# Patient Record
Sex: FEMALE | Race: WHITE | NOT HISPANIC OR LATINO | Employment: OTHER | URBAN - METROPOLITAN AREA
[De-identification: names, ages, dates, MRNs, and addresses within clinical notes are randomized per-mention and may not be internally consistent; named-entity substitution may affect disease eponyms.]

---

## 2017-07-14 ENCOUNTER — HOSPITAL ENCOUNTER (EMERGENCY)
Facility: CLINIC | Age: 62
Discharge: HOME | End: 2017-07-14
Attending: EMERGENCY MEDICINE | Admitting: EMERGENCY MEDICINE
Payer: COMMERCIAL

## 2017-07-14 ENCOUNTER — APPOINTMENT (OUTPATIENT)
Dept: CT IMAGING | Facility: CLINIC | Age: 62
End: 2017-07-14
Attending: EMERGENCY MEDICINE
Payer: COMMERCIAL

## 2017-07-14 VITALS — OXYGEN SATURATION: 92 % | TEMPERATURE: 97.9 F | DIASTOLIC BLOOD PRESSURE: 75 MMHG | SYSTOLIC BLOOD PRESSURE: 121 MMHG

## 2017-07-14 DIAGNOSIS — R10.32 ABDOMINAL PAIN, LEFT LOWER QUADRANT: ICD-10-CM

## 2017-07-14 DIAGNOSIS — R11.0 NAUSEA: ICD-10-CM

## 2017-07-14 DIAGNOSIS — R10.9 LEFT FLANK PAIN: ICD-10-CM

## 2017-07-14 LAB
ALBUMIN UR-MCNC: NEGATIVE MG/DL
ANION GAP SERPL CALCULATED.3IONS-SCNC: 8 MMOL/L (ref 3–14)
APPEARANCE UR: ABNORMAL
BASOPHILS # BLD AUTO: 0 10E9/L (ref 0–0.2)
BASOPHILS NFR BLD AUTO: 0.2 %
BILIRUB UR QL STRIP: NEGATIVE
BUN SERPL-MCNC: 19 MG/DL (ref 7–30)
CALCIUM SERPL-MCNC: 8.8 MG/DL (ref 8.5–10.1)
CHLORIDE SERPL-SCNC: 106 MMOL/L (ref 94–109)
CO2 SERPL-SCNC: 27 MMOL/L (ref 20–32)
COLOR UR AUTO: YELLOW
CREAT SERPL-MCNC: 1.12 MG/DL (ref 0.52–1.04)
DIFFERENTIAL METHOD BLD: ABNORMAL
EOSINOPHIL # BLD AUTO: 0 10E9/L (ref 0–0.7)
EOSINOPHIL NFR BLD AUTO: 0.3 %
ERYTHROCYTE [DISTWIDTH] IN BLOOD BY AUTOMATED COUNT: 12.7 % (ref 10–15)
GFR SERPL CREATININE-BSD FRML MDRD: 50 ML/MIN/1.7M2
GLUCOSE SERPL-MCNC: 141 MG/DL (ref 70–99)
GLUCOSE UR STRIP-MCNC: NEGATIVE MG/DL
HCT VFR BLD AUTO: 41.6 % (ref 35–47)
HGB BLD-MCNC: 13.5 G/DL (ref 11.7–15.7)
HGB UR QL STRIP: NEGATIVE
IMM GRANULOCYTES # BLD: 0 10E9/L (ref 0–0.4)
IMM GRANULOCYTES NFR BLD: 0.3 %
KETONES UR STRIP-MCNC: NEGATIVE MG/DL
LACTATE BLD-SCNC: 2.6 MMOL/L (ref 0.7–2.1)
LACTATE BLD-SCNC: 2.9 MMOL/L (ref 0.7–2.1)
LEUKOCYTE ESTERASE UR QL STRIP: ABNORMAL
LYMPHOCYTES # BLD AUTO: 1 10E9/L (ref 0.8–5.3)
LYMPHOCYTES NFR BLD AUTO: 8.7 %
MCH RBC QN AUTO: 29.6 PG (ref 26.5–33)
MCHC RBC AUTO-ENTMCNC: 32.5 G/DL (ref 31.5–36.5)
MCV RBC AUTO: 91 FL (ref 78–100)
MONOCYTES # BLD AUTO: 0.4 10E9/L (ref 0–1.3)
MONOCYTES NFR BLD AUTO: 3.6 %
MUCOUS THREADS #/AREA URNS LPF: PRESENT /LPF
NEUTROPHILS # BLD AUTO: 10.4 10E9/L (ref 1.6–8.3)
NEUTROPHILS NFR BLD AUTO: 86.9 %
NITRATE UR QL: NEGATIVE
PH UR STRIP: 7.5 PH (ref 5–7)
PLATELET # BLD AUTO: 200 10E9/L (ref 150–450)
POTASSIUM SERPL-SCNC: 3.8 MMOL/L (ref 3.4–5.3)
RBC # BLD AUTO: 4.56 10E12/L (ref 3.8–5.2)
RBC #/AREA URNS AUTO: 4 /HPF (ref 0–2)
SODIUM SERPL-SCNC: 141 MMOL/L (ref 133–144)
SP GR UR STRIP: 1.02 (ref 1–1.03)
SQUAMOUS #/AREA URNS AUTO: 1 /HPF (ref 0–1)
URN SPEC COLLECT METH UR: ABNORMAL
UROBILINOGEN UR STRIP-MCNC: NORMAL MG/DL (ref 0–2)
WBC # BLD AUTO: 12 10E9/L (ref 4–11)
WBC #/AREA URNS AUTO: 4 /HPF (ref 0–2)

## 2017-07-14 PROCEDURE — 85025 COMPLETE CBC W/AUTO DIFF WBC: CPT | Performed by: EMERGENCY MEDICINE

## 2017-07-14 PROCEDURE — 74176 CT ABD & PELVIS W/O CONTRAST: CPT

## 2017-07-14 PROCEDURE — 96374 THER/PROPH/DIAG INJ IV PUSH: CPT

## 2017-07-14 PROCEDURE — 25000128 H RX IP 250 OP 636: Performed by: EMERGENCY MEDICINE

## 2017-07-14 PROCEDURE — 83605 ASSAY OF LACTIC ACID: CPT | Performed by: EMERGENCY MEDICINE

## 2017-07-14 PROCEDURE — 25000132 ZZH RX MED GY IP 250 OP 250 PS 637: Performed by: EMERGENCY MEDICINE

## 2017-07-14 PROCEDURE — 99285 EMERGENCY DEPT VISIT HI MDM: CPT | Performed by: EMERGENCY MEDICINE

## 2017-07-14 PROCEDURE — 80048 BASIC METABOLIC PNL TOTAL CA: CPT | Performed by: EMERGENCY MEDICINE

## 2017-07-14 PROCEDURE — 99285 EMERGENCY DEPT VISIT HI MDM: CPT | Mod: 25

## 2017-07-14 PROCEDURE — 96361 HYDRATE IV INFUSION ADD-ON: CPT

## 2017-07-14 PROCEDURE — 96375 TX/PRO/DX INJ NEW DRUG ADDON: CPT

## 2017-07-14 PROCEDURE — 81001 URINALYSIS AUTO W/SCOPE: CPT | Performed by: EMERGENCY MEDICINE

## 2017-07-14 RX ORDER — HYDROCODONE BITARTRATE AND ACETAMINOPHEN 5; 325 MG/1; MG/1
1 TABLET ORAL EVERY 4 HOURS PRN
Status: DISCONTINUED | OUTPATIENT
Start: 2017-07-14 | End: 2017-07-14 | Stop reason: HOSPADM

## 2017-07-14 RX ORDER — KETOROLAC TROMETHAMINE 30 MG/ML
30 INJECTION, SOLUTION INTRAMUSCULAR; INTRAVENOUS ONCE
Status: COMPLETED | OUTPATIENT
Start: 2017-07-14 | End: 2017-07-14

## 2017-07-14 RX ORDER — ONDANSETRON 2 MG/ML
4 INJECTION INTRAMUSCULAR; INTRAVENOUS EVERY 30 MIN PRN
Status: DISCONTINUED | OUTPATIENT
Start: 2017-07-14 | End: 2017-07-14 | Stop reason: HOSPADM

## 2017-07-14 RX ORDER — LIDOCAINE 40 MG/G
CREAM TOPICAL
Status: DISCONTINUED | OUTPATIENT
Start: 2017-07-14 | End: 2017-07-14 | Stop reason: HOSPADM

## 2017-07-14 RX ORDER — HYDROCODONE BITARTRATE AND ACETAMINOPHEN 5; 325 MG/1; MG/1
2 TABLET ORAL ONCE
Status: COMPLETED | OUTPATIENT
Start: 2017-07-14 | End: 2017-07-14

## 2017-07-14 RX ORDER — HYDROCODONE BITARTRATE AND ACETAMINOPHEN 5; 325 MG/1; MG/1
1-2 TABLET ORAL EVERY 4 HOURS PRN
Qty: 5 TABLET | Refills: 0 | Status: SHIPPED | OUTPATIENT
Start: 2017-07-14 | End: 2022-11-02

## 2017-07-14 RX ORDER — HYDROMORPHONE HYDROCHLORIDE 1 MG/ML
0.5 INJECTION, SOLUTION INTRAMUSCULAR; INTRAVENOUS; SUBCUTANEOUS
Status: DISCONTINUED | OUTPATIENT
Start: 2017-07-14 | End: 2017-07-14 | Stop reason: HOSPADM

## 2017-07-14 RX ORDER — SODIUM CHLORIDE 9 MG/ML
1000 INJECTION, SOLUTION INTRAVENOUS CONTINUOUS
Status: DISCONTINUED | OUTPATIENT
Start: 2017-07-14 | End: 2017-07-14 | Stop reason: HOSPADM

## 2017-07-14 RX ORDER — HYDROCODONE BITARTRATE AND ACETAMINOPHEN 5; 325 MG/1; MG/1
1-2 TABLET ORAL EVERY 4 HOURS PRN
Qty: 10 TABLET | Refills: 0 | Status: SHIPPED | OUTPATIENT
Start: 2017-07-14 | End: 2022-11-02

## 2017-07-14 RX ADMIN — HYDROCODONE BITARTRATE AND ACETAMINOPHEN 2 TABLET: 5; 325 TABLET ORAL at 02:46

## 2017-07-14 RX ADMIN — SODIUM CHLORIDE 1000 ML: 9 INJECTION, SOLUTION INTRAVENOUS at 01:52

## 2017-07-14 RX ADMIN — KETOROLAC TROMETHAMINE 30 MG: 30 INJECTION, SOLUTION INTRAMUSCULAR at 00:35

## 2017-07-14 RX ADMIN — ONDANSETRON 4 MG: 2 INJECTION INTRAMUSCULAR; INTRAVENOUS at 00:35

## 2017-07-14 RX ADMIN — HYDROMORPHONE HYDROCHLORIDE 0.5 MG: 1 INJECTION, SOLUTION INTRAMUSCULAR; INTRAVENOUS; SUBCUTANEOUS at 00:36

## 2017-07-14 RX ADMIN — SODIUM CHLORIDE 1000 ML: 9 INJECTION, SOLUTION INTRAVENOUS at 00:35

## 2017-07-14 ASSESSMENT — ENCOUNTER SYMPTOMS
ABDOMINAL PAIN: 1
VOMITING: 1
FEVER: 0
DYSURIA: 0
FLANK PAIN: 1
NAUSEA: 1
DIARRHEA: 0
SHORTNESS OF BREATH: 0

## 2017-07-14 NOTE — ED PROVIDER NOTES
History     Chief Complaint   Patient presents with     Flank Pain     Pt here with left sided flank pain and LLQ abdominal pain .     JANE Lopez is a 61 year old female who has past medical history significant for depression, anxiety, migraine headaches, presenting to the emergency department with complaints of acute onset of left flank, left back, and left lower quadrant abdominal pain.  Pain began approximately 2 hours prior to arrival.  There was associated nausea, without vomiting.  Patient denies any urinary symptoms.  She did go to the bathroom once prior to arrival.  She had normal bowel movement earlier in the day.  She has history of prior appendectomy, no other surgical procedures.  She did have bowel perforation in May of last year, which was contained, and no surgery was required.  Denies fever, or chills.  Does have history of kidney stones, which feels somewhat similar.  Patient is never smoker, and no significant alcohol use.    I have reviewed the Medications, Allergies, Past Medical and Surgical History, and Social History in the Epic system.         Review of Systems   Constitutional: Negative for fever.   Respiratory: Negative for shortness of breath.    Cardiovascular: Negative for chest pain.   Gastrointestinal: Positive for abdominal pain, nausea and vomiting. Negative for diarrhea.   Genitourinary: Positive for flank pain. Negative for dysuria.   All other systems reviewed and are negative.      Physical Exam   BP: (!) 143/95  Heart Rate: 85  Temp: 97.9  F (36.6  C)  SpO2: 98 %  Physical Exam  BP (!) 143/95  Temp 97.9  F (36.6  C) (Oral)  SpO2 98%  General: alert, interactive, in no apparent distress  Head: atraumatic  Nose: no rhinorrhea or epistaxis  Ears: no external auditory canal discharge or bleeding.    Eyes: Sclera nonicteric. Conjunctiva noninjected. PERRL, EOMI  Mouth: no tonsillar erythema, edema, or exudate  Neck: supple, no palp LAD  Lungs: CTAB  CV: RRR, S1/S2;  peripheral pulses palpable and symmetric  Abdomen: soft, tender in LLQ/left flank, nd, no guarding or rebound. Positive bowel sounds  Extremities: no cyanosis or edema  Skin: no rash or diaphoresis  Neuro: CN II-XII grossly intact, strength 5/5 in UE and LEs bilaterally, sensation intact to light touch in UE and LEs bilaterally;       ED Course     ED Course     Procedures             Critical Care time:  none     Lactate is greater than 2 due to vomiting and gastroenteritis, at this time there is no sign of sepsis.         Labs Ordered and Resulted from Time of ED Arrival Up to the Time of Departure from the ED   URINE MACROSCOPIC WITH REFLEX TO MICRO - Abnormal; Notable for the following:        Result Value    pH Urine 7.5 (*)     Leukocyte Esterase Urine Small (*)     RBC Urine 4 (*)     WBC Urine 4 (*)     Mucous Urine Present (*)     All other components within normal limits   CBC WITH PLATELETS DIFFERENTIAL - Abnormal; Notable for the following:     WBC 12.0 (*)     All other components within normal limits   BASIC METABOLIC PANEL - Abnormal; Notable for the following:     Glucose 141 (*)     Creatinine 1.12 (*)     GFR Estimate 50 (*)     GFR Estimate If Black 60 (*)     All other components within normal limits   LACTIC ACID WHOLE BLOOD - Abnormal; Notable for the following:     Lactic Acid 2.9 (*)     All other components within normal limits   LACTIC ACID WHOLE BLOOD - Abnormal; Notable for the following:     Lactic Acid 2.6 (*)     All other components within normal limits   PERIPHERAL IV CATHETER     Results for orders placed or performed during the hospital encounter of 07/14/17 (from the past 24 hour(s))   UA reflex to Microscopic   Result Value Ref Range    Color Urine Yellow     Appearance Urine Slightly Cloudy     Glucose Urine Negative NEG mg/dL    Bilirubin Urine Negative NEG    Ketones Urine Negative NEG mg/dL    Specific Gravity Urine 1.019 1.003 - 1.035    Blood Urine Negative NEG    pH Urine  7.5 (H) 5.0 - 7.0 pH    Protein Albumin Urine Negative NEG mg/dL    Urobilinogen mg/dL Normal 0.0 - 2.0 mg/dL    Nitrite Urine Negative NEG    Leukocyte Esterase Urine Small (A) NEG    Source Unspecified Urine     RBC Urine 4 (H) 0 - 2 /HPF    WBC Urine 4 (H) 0 - 2 /HPF    Squamous Epithelial /HPF Urine 1 0 - 1 /HPF    Mucous Urine Present (A) NEG /LPF   CBC with platelets differential   Result Value Ref Range    WBC 12.0 (H) 4.0 - 11.0 10e9/L    RBC Count 4.56 3.8 - 5.2 10e12/L    Hemoglobin 13.5 11.7 - 15.7 g/dL    Hematocrit 41.6 35.0 - 47.0 %    MCV 91 78 - 100 fl    MCH 29.6 26.5 - 33.0 pg    MCHC 32.5 31.5 - 36.5 g/dL    RDW 12.7 10.0 - 15.0 %    Platelet Count 200 150 - 450 10e9/L    Diff Method Pending    Basic metabolic panel   Result Value Ref Range    Sodium 141 133 - 144 mmol/L    Potassium 3.8 3.4 - 5.3 mmol/L    Chloride 106 94 - 109 mmol/L    Carbon Dioxide 27 20 - 32 mmol/L    Anion Gap 8 3 - 14 mmol/L    Glucose 141 (H) 70 - 99 mg/dL    Urea Nitrogen 19 7 - 30 mg/dL    Creatinine 1.12 (H) 0.52 - 1.04 mg/dL    GFR Estimate 50 (L) >60 mL/min/1.7m2    GFR Estimate If Black 60 (L) >60 mL/min/1.7m2    Calcium 8.8 8.5 - 10.1 mg/dL   Lactic acid whole blood   Result Value Ref Range    Lactic Acid 2.9 (H) 0.7 - 2.1 mmol/L   CT Abdomen Pelvis w/o Contrast    Narrative    CT ABDOMEN AND PELVIS WITHOUT CONTRAST  7/14/2017 1:14 AM     HISTORY: Left flank and left lower quadrant pain.    COMPARISON: 5/13/2015.    TECHNIQUE: Without intravenous or oral contrast, helical sections were  acquired from the top of the diaphragm through the pubic symphysis.  Coronal reconstructions were generated. Radiation dose for this scan  was reduced using automated exposure control, adjustment of the mA  and/or kV according to the patient's size, or iterative reconstruction  technique. (Renal stone protocol).    FINDINGS:  Right urinary tract: At least three tiny less than 0.3 cm diameter  nonobstructing renal calculi. No  ureteral calculi. No dilatation of  the intrarenal collecting system or ureter.    Left urinary tract: No visualized renal or ureteral calculi. Mild  dilatation of the intrarenal collecting system and proximal ureter.  Moderate edema is present about the proximal ureter and renal sinus  region.    Urinary bladder: No visualized calculi.    Remainder of the abdomen and pelvis: The liver, spleen, pancreas and  adrenal glands are unremarkable to the limits of a noncontrast CT  scan. The gallbladder is present. 3 cm paraampullary duodenal  diverticulum. The small and large bowel are normal in caliber. A few  colonic diverticula are present without evidence of diverticulitis.  The appendix is not visualized. No bowel wall thickening, pneumatosis  or free intraperitoneal gas. No enlarged lymph nodes or free fluid in  the abdomen or pelvis.    Scan through the lower chest is significant for a 0.5 cm nodule in the  right middle lobe that is unchanged since 5/13/2015 and therefore  consistent with benign etiology.      Impression    IMPRESSION:  1. Mild dilatation of the left intrarenal collecting system and  ureter, without visualized cause. Possibilities include a recently  passed left ureteral calculus. Of note there is prominent edema about  the proximal left ureter and left renal sinus region, possibly  representing pyelosinus rupture.  2. No other cause of acute pain identified in the abdomen or pelvis.  3. A few tiny nonobstructing right renal calculi.  4. Colonic diverticulosis without evidence of diverticulitis.    MARI VEGA MD   Lactic acid whole blood   Result Value Ref Range    Lactic Acid 2.6 (H) 0.7 - 2.1 mmol/L          Assessments & Plan (with Medical Decision Making)  61 year old female , with history of depression, anxiety, migraine headaches, presenting to the emergency department with complaints of left flank, and left upper quadrant abdominal pain that began abruptly this evening at 11 PM.   Symptoms are similar to previous episode of kidney stone, and consistent with kidney stone type pain.  Urine test shows slight amounts of blood, in addition to white blood cells.  However, CT scan of the abdomen/pelvis shows no evidence of ureteral stones.  There is, however, suggestion of possible recently passed kidney stone.  I did have discussion with Dr. Franco regarding CT findings.  There is no clinical significance to pyelosinus rupture.  Patient had initially elevated lactate at 2.9.  This was repeated, and continues to be elevated at 2.6.    Patient reassessed on multiple occasions.  Her pain has been much improved.  The lactate continues to be elevated, however pain is improved, patient has received IV fluids, and is extremely well appearing.  She has no history of atrial fibrillation.  Rate is regular.  There is no right-sided abdominal pains, and I feel this is not likely to be ischemic bowel as patient does not have other risk factors.  No tobacco use, other vascular disease, diabetes, or other major medical issues.    I feel patient's pain and symptoms are most likely the result of recently passed kidney stone.  There is been no trauma, accidents, or other musculoskeletal injuries.  No fever.  Patient has been nauseous, without vomiting.  I discussed hospital observation with the patient, however she states that she has the day off from work today, and can rest at home.  I have encouraged her to return if worsening pains.  Otherwise follow-up with primary care provider as needed.    I did have discussion with radiologist on the telephone.  No other acute findings on CT scan.       I have reviewed the nursing notes.    I have reviewed the findings, diagnosis, plan and need for follow up with the patient.       New Prescriptions    HYDROCODONE-ACETAMINOPHEN (NORCO) 5-325 MG PER TABLET    Take 1-2 tablets by mouth every 4 hours as needed for moderate to severe pain    HYDROCODONE-ACETAMINOPHEN (NORCO)  5-325 MG PER TABLET    Take 1-2 tablets by mouth every 4 hours as needed for moderate to severe pain       Final diagnoses:   Left flank pain   Abdominal pain, left lower quadrant   Nausea       7/14/2017   [facility] EMERGENCY DEPARTMENT       Delbert Barajas MD  07/14/17 [id number]

## 2017-07-14 NOTE — ED AVS SNAPSHOT
[facility] Emergency Department    [address] [facility] [address]    Phone:  [phone]    Fax:  [phone]                                       Irena Lopez   MRN: [id number]    Department:  [facility] Emergency Department   Date of Visit:  7/14/2017           Patient Information     Date Of Birth          1955        Your diagnoses for this visit were:     Left flank pain     Abdominal pain, left lower quadrant     Nausea        You were seen by Dlebert Barajas MD.        Discharge Instructions       Use ibuprofen 400-600 mg up to 4 times per day if needed for pain. Stop if it is causing nausea or abdominal pain.   Add Norco 5-325 (hydrocodone-acetaminophen) 1-2 pills up to every 4 hours if needed for pain. Do not use alcohol, operate machinery, drive, or climb on ladders for 8 hours after taking Norco. Use docusate (100mg) 2 times a day to prevent constipation while on narcotics.        Abdominal Pain    Abdominal pain is pain in the stomach or belly area. Everyone has this pain from time to time. In many cases it goes away on its own. But abdominal pain can sometimes be due to a serious problem, such as appendicitis. So it s important to know when to seek help.  Causes of abdominal pain  There are many possible causes of abdominal pain. Common causes in adults include:    Constipation, diarrhea, or gas    Stomach acid flowing back up into the esophagus (acid reflux or heartburn)    Severe acid reflux, called GERD (gastroesophageal reflux disease)    A sore in the lining of the stomach or small intestine (peptic ulcer)    Inflammation of the gallbladder, liver, or pancreas    Gallstones or kidney stones    Appendicitis     Intestinal blockage     An internal organ pushing through a muscle or other tissue (hernia)    Urinary tract infections    In women, menstrual cramps, fibroids, or endometriosis    Inflammation or infection of the intestines  Diagnosing the cause of abdominal  pain  Your healthcare provider will do a physical exam help find the cause of your pain. If needed, tests will be ordered. Belly pain has many possible causes. So it can be hard to find the reason for your pain. Giving details about your pain can help. Tell your provider where and when you feel the pain, and what makes it better or worse. Also let your provider know if you have other symptoms such as:    Fever    Tiredness    Upset stomach (nausea)    Vomiting    Changes in bathroom habits  Treating abdominal pain  Some causes of pain need emergency medical treatment right away. These include appendicitis or a bowel blockage. Other problems can be treated with rest, fluids, or medicines. Your healthcare provider can give you specific instructions for treatment or self-care based on what is causing your pain.  If you have vomiting or diarrhea, sip water or other clear fluids. When you are ready to eat solid foods again, start with small amounts of easy-to-digest, low-fat foods. These include apple sauce, toast, or crackers.   When to seek medical care  Call 911 or go to the hospital right away if you:    Can t pass stool and are vomiting    Are vomiting blood or have bloody diarrhea or black, tarry diarrhea    Have chest, neck, or shoulder pain    Feel like you might pass out    Have pain in your shoulder blades with nausea    Have sudden, severe belly pain    Have new, severe pain unlike any you have felt before    Have a belly that is rigid, hard, and tender to touch  Call your healthcare provider if you have:    Pain for more than 5 days    Bloating for more than 2 days    Diarrhea for more than 5 days    A fever of 100.4 F (38.0 C) or higher, or as directed by your provider    Pain that gets worse    Weight loss for no reason    Continued lack of appetite    Blood in your stool  How to prevent abdominal pain  Here are some tips to help prevent abdominal pain:    Eat smaller amounts of food at one time.    Avoid  greasy, fried, or other high-fat foods.    Avoid foods that give you gas.    Exercise regularly.    Drink plenty of fluids.  To help prevent GERD symptoms:    Quit smoking.    Reduce alcohol and certain foods that increase stomach acid.    Avoid aspirin and over-the-counter pain and fever medicines (NSAIDS or nonsteroidal anti-inflammatory drugs), if possible    Lose extra weight.    Finish eating at least 2 hours before you go to bed or lie down.    Raise the head of your bed.  Date Last Reviewed: 7/1/2016 2000-2017 [employer]. [address] [place] [id number]. All rights reserved. This information is not intended as a substitute for professional medical care. Always follow your healthcare professional's instructions.          24 Hour Appointment Hotline       To make an appointment at any [facility], call [phone] ([phone]). If you don't have a family doctor or clinic, we will help you find one. [facility] clinics are conveniently located to serve the needs of you and your family.             Review of your medicines      START taking        Dose / Directions Last dose taken    * HYDROcodone-acetaminophen 5-325 MG per tablet   Commonly known as:  NORCO   Dose:  1-2 tablet   Quantity:  5 tablet        Take 1-2 tablets by mouth every 4 hours as needed for moderate to severe pain   Refills:  0        * HYDROcodone-acetaminophen 5-325 MG per tablet   Commonly known as:  NORCO   Dose:  1-2 tablet   Quantity:  10 tablet        Take 1-2 tablets by mouth every 4 hours as needed for moderate to severe pain   Refills:  0        * Notice:  This list has 2 medication(s) that are the same as other medications prescribed for you. Read the directions carefully, and ask your doctor or other care provider to review them with you.      Our records show that you are taking the medicines listed below. If these are incorrect, please call your family doctor or clinic.        Dose / Directions  Last dose taken    ASTELIN 0.1 % spray   Quantity:  1 Bottle   Generic drug:  azelastine        USE 1-2 SPRAYS IN EACH NOSTRIL TWICE DAILY as needed for severe allergy symptoms   Refills:  0        atorvastatin 20 MG tablet   Commonly known as:  LIPITOR   Dose:  20 mg        Take 20 mg by mouth every evening   Refills:  0        COQ10 PO   Dose:  200 mg        Take 200 mg by mouth every evening   Refills:  0        fish oil-omega-3 fatty acids 1000 MG capsule   Quantity:  30 Cap        ONE DAILY   Refills:  0        fluticasone 50 MCG/ACT spray   Commonly known as:  FLONASE   Dose:  1 spray        Spray 1 spray into both nostrils 2 times daily   Refills:  0        Glucosamine-Chondroitin--400-300 MG Tabs   Dose:  1 tablet        Take 1 tablet by mouth daily.   Refills:  0        MAGNESIUM OXIDE PO   Dose:  400 mg        Take 400 mg by mouth every evening   Refills:  0        metoclopramide 10 MG tablet   Commonly known as:  REGLAN   Quantity:  30 Tab        1 TABLET by mouth daily AS NEEDED   Refills:  0        multivitamin per tablet   Quantity:  100 Tab        ONE DAILY   Refills:  0        naproxen sodium 550 MG tablet   Commonly known as:  ANAPROX   Quantity:  60 Tab        1 TABLET TWICE DAILY PRN   Refills:  0        nortriptyline 50 MG capsule   Commonly known as:  PAMELOR   Dose:   mg   Quantity:  90 Cap        Take  mg by mouth At Bedtime   Refills:  0        PATANOL 0.1 % ophthalmic solution   Quantity:  1 Bottle   Generic drug:  olopatadine        1 gtt each eye BID prn   Refills:  0        RELPAX 40 MG tablet   Quantity:  6 Tab   Generic drug:  eletriptan        ONE TABLET WITH ONSET OF MIGRAINE, MAY REPEAT ONCE AFTER 2 HOURS. DO NOT EXCEED 2 TABLETS IN 24 HOURS.   Refills:  0        SUMAtriptan 100 MG tablet   Commonly known as:  IMITREX   Dose:  100 mg        Take 100 mg by mouth at onset of headache. May repeat in 2 hours if needed: max 2/day; average number of headaches monthly: 4    Refills:  0        SUPER B COMPLEX/C PO   Dose:  1 tablet        Take 1 tablet by mouth every evening   Refills:  0        TYLENOL/codeine #3 300-30 MG per tablet   Quantity:  28 Tab   Generic drug:  acetaminophen-codeine        ONE TABLET EVERY 6 HOURS AS NEEDED   Refills:  0        VERAPAMIL HCL ER PO   Dose:  120 mg        Take 120 mg by mouth daily   Refills:  0        vitamin D 400 UNITS capsule   Dose:  1 capsule        Take 1 capsule by mouth daily.   Refills:  0        ZYRTEC 10 MG tablet   Quantity:  30 Tab   Generic drug:  cetirizine        ONE TABLET DAILY in pm   Refills:  0                Prescriptions were sent or printed at these locations (2 Prescriptions)                   Other Prescriptions                Printed at Department/Unit printer (2 of 2)         HYDROcodone-acetaminophen (NORCO) 5-325 MG per tablet               HYDROcodone-acetaminophen (NORCO) 5-325 MG per tablet                Procedures and tests performed during your visit     Procedure/Test Number of Times Performed    Basic metabolic panel 1    CBC with platelets differential 1    CT Abdomen Pelvis w/o Contrast 1    Lactic acid whole blood 2    Peripheral IV catheter 1    UA reflex to Microscopic 1      Orders Needing Specimen Collection     None      Pending Results     Date and Time Order Name Status Description    7/14/2017 0019 CBC with platelets differential In process             Pending Culture Results     No orders found from 7/12/2017 to 7/15/2017.            Pending Results Instructions     If you had any lab results that were not finalized at the time of your Discharge, you can call the ED Lab Result RN at [phone]. You will be contacted by this team for any positive Lab results or changes in treatment. The nurses are available 7 days a week from 10A to 6:30P.  You can leave a message 24 hours per day and they will return your call.        Test Results From Your Hospital Stay        7/14/2017  2:11 AM       Component Results     Component Value Ref Range & Units Status    Color Urine Yellow  Final    Appearance Urine Slightly Cloudy  Final    Glucose Urine Negative NEG mg/dL Final    Bilirubin Urine Negative NEG Final    Ketones Urine Negative NEG mg/dL Final    Specific Gravity Urine 1.019 1.003 - 1.035 Final    Blood Urine Negative NEG Final    pH Urine 7.5 (H) 5.0 - 7.0 pH Final    Protein Albumin Urine Negative NEG mg/dL Final    Urobilinogen mg/dL Normal 0.0 - 2.0 mg/dL Final    Nitrite Urine Negative NEG Final    Leukocyte Esterase Urine Small (A) NEG Final    Source Unspecified Urine  Final    RBC Urine 4 (H) 0 - 2 /HPF Final    WBC Urine 4 (H) 0 - 2 /HPF Final    Squamous Epithelial /HPF Urine 1 0 - 1 /HPF Final    Mucous Urine Present (A) NEG /LPF Final         7/14/2017 12:55 AM      Component Results     Component Value Ref Range & Units Status    WBC 12.0 (H) 4.0 - 11.0 10e9/L Final    RBC Count 4.56 3.8 - 5.2 10e12/L Final    Hemoglobin 13.5 11.7 - 15.7 g/dL Final    Hematocrit 41.6 35.0 - 47.0 % Final    MCV 91 78 - 100 fl Final    MCH 29.6 26.5 - 33.0 pg Final    MCHC 32.5 31.5 - 36.5 g/dL Final    RDW 12.7 10.0 - 15.0 % Final    Platelet Count 200 150 - 450 10e9/L Final    Diff Method Pending  Incomplete         7/14/2017  1:08 AM      Component Results     Component Value Ref Range & Units Status    Sodium 141 133 - 144 mmol/L Final    Potassium 3.8 3.4 - 5.3 mmol/L Final    Chloride 106 94 - 109 mmol/L Final    Carbon Dioxide 27 20 - 32 mmol/L Final    Anion Gap 8 3 - 14 mmol/L Final    Glucose 141 (H) 70 - 99 mg/dL Final    Urea Nitrogen 19 7 - 30 mg/dL Final    Creatinine 1.12 (H) 0.52 - 1.04 mg/dL Final    GFR Estimate 50 (L) >60 mL/min/1.7m2 Final    Non  GFR Calc    GFR Estimate If Black 60 (L) >60 mL/min/1.7m2 Final    African American GFR Calc    Calcium 8.8 8.5 - 10.1 mg/dL Final         7/14/2017  1:21 AM      Component Results     Component Value Ref Range & Units Status     Lactic Acid 2.9 (H) 0.7 - 2.1 mmol/L Final    Significant value called to and read back by  SHAJI VELARDE [id number] 17 BY FRANK           7/14/2017  1:42 AM      Narrative     CT ABDOMEN AND PELVIS WITHOUT CONTRAST  7/14/2017 1:14 AM     HISTORY: Left flank and left lower quadrant pain.    COMPARISON: 5/13/2015.    TECHNIQUE: Without intravenous or oral contrast, helical sections were  acquired from the top of the diaphragm through the pubic symphysis.  Coronal reconstructions were generated. Radiation dose for this scan  was reduced using automated exposure control, adjustment of the mA  and/or kV according to the patient's size, or iterative reconstruction  technique. (Renal stone protocol).    FINDINGS:  Right urinary tract: At least three tiny less than 0.3 cm diameter  nonobstructing renal calculi. No ureteral calculi. No dilatation of  the intrarenal collecting system or ureter.    Left urinary tract: No visualized renal or ureteral calculi. Mild  dilatation of the intrarenal collecting system and proximal ureter.  Moderate edema is present about the proximal ureter and renal sinus  region.    Urinary bladder: No visualized calculi.    Remainder of the abdomen and pelvis: The liver, spleen, pancreas and  adrenal glands are unremarkable to the limits of a noncontrast CT  scan. The gallbladder is present. 3 cm paraampullary duodenal  diverticulum. The small and large bowel are normal in caliber. A few  colonic diverticula are present without evidence of diverticulitis.  The appendix is not visualized. No bowel wall thickening, pneumatosis  or free intraperitoneal gas. No enlarged lymph nodes or free fluid in  the abdomen or pelvis.    Scan through the lower chest is significant for a 0.5 cm nodule in the  right middle lobe that is unchanged since 5/13/2015 and therefore  consistent with benign etiology.        Impression     IMPRESSION:  1. Mild dilatation of the left intrarenal collecting system and  ureter,  "without visualized cause. Possibilities include a recently  passed left ureteral calculus. Of note there is prominent edema about  the proximal left ureter and left renal sinus region, possibly  representing pyelosinus rupture.  2. No other cause of acute pain identified in the abdomen or pelvis.  3. A few tiny nonobstructing right renal calculi.  4. Colonic diverticulosis without evidence of diverticulitis.    MARI VEGA MD         2017  4:18 AM      Component Results     Component Value Ref Range & Units Status    Lactic Acid 2.6 (H) 0.7 - 2.1 mmol/L Final    Consistent with previous critical result                Thank you for choosing [facility]       Thank you for choosing [facility] for your care. Our goal is always to provide you with excellent care. Hearing back from our patients is one way we can continue to improve our services. Please take a few minutes to complete the written survey that you may receive in the mail after you visit with us. Thank you!        [employer] Information     [employer] lets you send messages to your doctor, view your test results, renew your prescriptions, schedule appointments and more. To sign up, go to [email or web address] . Click on \"Log in\" on the left side of the screen, which will take you to the Welcome page. Then click on \"Sign up Now\" on the right side of the page.     You will be asked to enter the access code listed below, as well as some personal information. Please follow the directions to create your username and password.     Your access code is: [email or web address]  Expires: 10/12/2017  4:56 AM     Your access code will  in 90 days. If you need help or a new code, please call your [facility] clinic or [phone].        Care EveryWhere ID     This is your Care EveryWhere ID. This could be used by other organizations to access your [facility] medical records  [id number]        Equal Access to Services     JONAS ARMIJO : eris Snowden " suhas medina waxay idiin hayaan adeeg kharash la'aan ah. So [facility] [phone].    ATENCIÓN: Si habla kevin, tiene a parikh disposición servicios gratuitos de asistencia lingüística. Llame al [phone].    We comply with applicable federal civil rights laws and [place] laws. We do not discriminate on the basis of race, color, national origin, age, disability sex, sexual orientation or gender identity.            After Visit Summary       This is your record. Keep this with you and show to your community pharmacist(s) and doctor(s) at your next visit.

## 2017-07-14 NOTE — DISCHARGE INSTRUCTIONS
Use ibuprofen 400-600 mg up to 4 times per day if needed for pain. Stop if it is causing nausea or abdominal pain.   Add Norco 5-325 (hydrocodone-acetaminophen) 1-2 pills up to every 4 hours if needed for pain. Do not use alcohol, operate machinery, drive, or climb on ladders for 8 hours after taking Norco. Use docusate (100mg) 2 times a day to prevent constipation while on narcotics.        Abdominal Pain    Abdominal pain is pain in the stomach or belly area. Everyone has this pain from time to time. In many cases it goes away on its own. But abdominal pain can sometimes be due to a serious problem, such as appendicitis. So it s important to know when to seek help.  Causes of abdominal pain  There are many possible causes of abdominal pain. Common causes in adults include:    Constipation, diarrhea, or gas    Stomach acid flowing back up into the esophagus (acid reflux or heartburn)    Severe acid reflux, called GERD (gastroesophageal reflux disease)    A sore in the lining of the stomach or small intestine (peptic ulcer)    Inflammation of the gallbladder, liver, or pancreas    Gallstones or kidney stones    Appendicitis     Intestinal blockage     An internal organ pushing through a muscle or other tissue (hernia)    Urinary tract infections    In women, menstrual cramps, fibroids, or endometriosis    Inflammation or infection of the intestines  Diagnosing the cause of abdominal pain  Your healthcare provider will do a physical exam help find the cause of your pain. If needed, tests will be ordered. Belly pain has many possible causes. So it can be hard to find the reason for your pain. Giving details about your pain can help. Tell your provider where and when you feel the pain, and what makes it better or worse. Also let your provider know if you have other symptoms such as:    Fever    Tiredness    Upset stomach (nausea)    Vomiting    Changes in bathroom habits  Treating abdominal pain  Some causes of pain  need emergency medical treatment right away. These include appendicitis or a bowel blockage. Other problems can be treated with rest, fluids, or medicines. Your healthcare provider can give you specific instructions for treatment or self-care based on what is causing your pain.  If you have vomiting or diarrhea, sip water or other clear fluids. When you are ready to eat solid foods again, start with small amounts of easy-to-digest, low-fat foods. These include apple sauce, toast, or crackers.   When to seek medical care  Call 911 or go to the hospital right away if you:    Can t pass stool and are vomiting    Are vomiting blood or have bloody diarrhea or black, tarry diarrhea    Have chest, neck, or shoulder pain    Feel like you might pass out    Have pain in your shoulder blades with nausea    Have sudden, severe belly pain    Have new, severe pain unlike any you have felt before    Have a belly that is rigid, hard, and tender to touch  Call your healthcare provider if you have:    Pain for more than 5 days    Bloating for more than 2 days    Diarrhea for more than 5 days    A fever of 100.4 F (38.0 C) or higher, or as directed by your provider    Pain that gets worse    Weight loss for no reason    Continued lack of appetite    Blood in your stool  How to prevent abdominal pain  Here are some tips to help prevent abdominal pain:    Eat smaller amounts of food at one time.    Avoid greasy, fried, or other high-fat foods.    Avoid foods that give you gas.    Exercise regularly.    Drink plenty of fluids.  To help prevent GERD symptoms:    Quit smoking.    Reduce alcohol and certain foods that increase stomach acid.    Avoid aspirin and over-the-counter pain and fever medicines (NSAIDS or nonsteroidal anti-inflammatory drugs), if possible    Lose extra weight.    Finish eating at least 2 hours before you go to bed or lie down.    Raise the head of your bed.  Date Last Reviewed: 7/1/2016 2000-2017 [employer]. [address] [place] [address], [place] [address]. All rights reserved. This information is not intended as a substitute for professional medical care. Always follow your healthcare professional's instructions.

## 2017-07-14 NOTE — ED AVS SNAPSHOT
[facility] Department    [address] [facility] [address]    Phone:  [phone]    Fax:  [phone]                                       Irena Lopez   MRN: [id number]    Department:  [facility] Emergency Department   Date of Visit:  7/14/2017           After Visit Summary Signature Page     I have received my discharge instructions, and my questions have been answered. I have discussed any challenges I see with this plan with the nurse or doctor.    ..........................................................................................................................................  Patient/Patient Representative Signature      ..........................................................................................................................................  Patient Representative Print Name and Relationship to Patient    ..................................................               ................................................  Date                                            Time    ..........................................................................................................................................  Reviewed by Signature/Title    ...................................................              ..............................................  Date                                                            Time

## 2021-07-08 DIAGNOSIS — Z11.59 ENCOUNTER FOR SCREENING FOR OTHER VIRAL DISEASES: ICD-10-CM

## 2022-10-29 ENCOUNTER — HOSPITAL ENCOUNTER (EMERGENCY)
Facility: CLINIC | Age: 67
Discharge: HOME | End: 2022-10-29
Attending: EMERGENCY MEDICINE | Admitting: EMERGENCY MEDICINE
Payer: COMMERCIAL

## 2022-10-29 ENCOUNTER — APPOINTMENT (OUTPATIENT)
Dept: CT IMAGING | Facility: CLINIC | Age: 67
End: 2022-10-29
Attending: EMERGENCY MEDICINE
Payer: COMMERCIAL

## 2022-10-29 VITALS
WEIGHT: 183 LBS | DIASTOLIC BLOOD PRESSURE: 74 MMHG | SYSTOLIC BLOOD PRESSURE: 123 MMHG | OXYGEN SATURATION: 98 % | TEMPERATURE: 98 F | RESPIRATION RATE: 16 BRPM | HEART RATE: 78 BPM | BODY MASS INDEX: 27.74 KG/M2 | HEIGHT: 68 IN

## 2022-10-29 DIAGNOSIS — N13.30 HYDRONEPHROSIS, LEFT: ICD-10-CM

## 2022-10-29 DIAGNOSIS — R10.9 LEFT FLANK PAIN: ICD-10-CM

## 2022-10-29 LAB
ALBUMIN UR-MCNC: NEGATIVE MG/DL
ANION GAP SERPL CALCULATED.3IONS-SCNC: 11 MMOL/L (ref 7–15)
APPEARANCE UR: CLEAR
BASOPHILS # BLD AUTO: 0 10E3/UL (ref 0–0.2)
BASOPHILS NFR BLD AUTO: 0 %
BILIRUB UR QL STRIP: NEGATIVE
BUN SERPL-MCNC: 21.3 MG/DL (ref 8–23)
CALCIUM SERPL-MCNC: 9.1 MG/DL (ref 8.8–10.2)
CHLORIDE SERPL-SCNC: 105 MMOL/L (ref 98–107)
COLOR UR AUTO: YELLOW
CREAT SERPL-MCNC: 1.08 MG/DL (ref 0.51–0.95)
DEPRECATED HCO3 PLAS-SCNC: 24 MMOL/L (ref 22–29)
EOSINOPHIL # BLD AUTO: 0 10E3/UL (ref 0–0.7)
EOSINOPHIL NFR BLD AUTO: 0 %
ERYTHROCYTE [DISTWIDTH] IN BLOOD BY AUTOMATED COUNT: 12.5 % (ref 10–15)
GFR SERPL CREATININE-BSD FRML MDRD: 57 ML/MIN/1.73M2
GLUCOSE SERPL-MCNC: 136 MG/DL (ref 70–99)
GLUCOSE UR STRIP-MCNC: NEGATIVE MG/DL
HCT VFR BLD AUTO: 38.7 % (ref 35–47)
HGB BLD-MCNC: 12.8 G/DL (ref 11.7–15.7)
HGB UR QL STRIP: NEGATIVE
IMM GRANULOCYTES # BLD: 0.1 10E3/UL
IMM GRANULOCYTES NFR BLD: 0 %
KETONES UR STRIP-MCNC: NEGATIVE MG/DL
LEUKOCYTE ESTERASE UR QL STRIP: NEGATIVE
LYMPHOCYTES # BLD AUTO: 0.6 10E3/UL (ref 0.8–5.3)
LYMPHOCYTES NFR BLD AUTO: 6 %
MCH RBC QN AUTO: 30.3 PG (ref 26.5–33)
MCHC RBC AUTO-ENTMCNC: 33.1 G/DL (ref 31.5–36.5)
MCV RBC AUTO: 92 FL (ref 78–100)
MONOCYTES # BLD AUTO: 0.2 10E3/UL (ref 0–1.3)
MONOCYTES NFR BLD AUTO: 2 %
MUCOUS THREADS #/AREA URNS LPF: PRESENT /LPF
NEUTROPHILS # BLD AUTO: 10.2 10E3/UL (ref 1.6–8.3)
NEUTROPHILS NFR BLD AUTO: 92 %
NITRATE UR QL: NEGATIVE
NRBC # BLD AUTO: 0 10E3/UL
NRBC BLD AUTO-RTO: 0 /100
PH UR STRIP: 6.5 [PH] (ref 5–7)
PLATELET # BLD AUTO: 222 10E3/UL (ref 150–450)
POTASSIUM SERPL-SCNC: 4.4 MMOL/L (ref 3.4–5.3)
RBC # BLD AUTO: 4.23 10E6/UL (ref 3.8–5.2)
RBC URINE: 2 /HPF
SODIUM SERPL-SCNC: 140 MMOL/L (ref 136–145)
SP GR UR STRIP: 1.02 (ref 1–1.03)
SQUAMOUS EPITHELIAL: 2 /HPF
UROBILINOGEN UR STRIP-MCNC: NORMAL MG/DL
WBC # BLD AUTO: 11.1 10E3/UL (ref 4–11)
WBC URINE: 2 /HPF

## 2022-10-29 PROCEDURE — 99285 EMERGENCY DEPT VISIT HI MDM: CPT | Mod: 25 | Performed by: EMERGENCY MEDICINE

## 2022-10-29 PROCEDURE — 36415 COLL VENOUS BLD VENIPUNCTURE: CPT | Performed by: EMERGENCY MEDICINE

## 2022-10-29 PROCEDURE — 258N000003 HC RX IP 258 OP 636: Performed by: EMERGENCY MEDICINE

## 2022-10-29 PROCEDURE — 250N000011 HC RX IP 250 OP 636

## 2022-10-29 PROCEDURE — 96361 HYDRATE IV INFUSION ADD-ON: CPT | Performed by: EMERGENCY MEDICINE

## 2022-10-29 PROCEDURE — 74176 CT ABD & PELVIS W/O CONTRAST: CPT

## 2022-10-29 PROCEDURE — 96376 TX/PRO/DX INJ SAME DRUG ADON: CPT | Performed by: EMERGENCY MEDICINE

## 2022-10-29 PROCEDURE — 250N000011 HC RX IP 250 OP 636: Performed by: EMERGENCY MEDICINE

## 2022-10-29 PROCEDURE — 82310 ASSAY OF CALCIUM: CPT | Performed by: EMERGENCY MEDICINE

## 2022-10-29 PROCEDURE — 85025 COMPLETE CBC W/AUTO DIFF WBC: CPT | Performed by: EMERGENCY MEDICINE

## 2022-10-29 PROCEDURE — 99285 EMERGENCY DEPT VISIT HI MDM: CPT | Performed by: EMERGENCY MEDICINE

## 2022-10-29 PROCEDURE — 96374 THER/PROPH/DIAG INJ IV PUSH: CPT | Performed by: EMERGENCY MEDICINE

## 2022-10-29 PROCEDURE — 81001 URINALYSIS AUTO W/SCOPE: CPT | Performed by: EMERGENCY MEDICINE

## 2022-10-29 PROCEDURE — 96375 TX/PRO/DX INJ NEW DRUG ADDON: CPT | Performed by: EMERGENCY MEDICINE

## 2022-10-29 RX ORDER — OXYCODONE AND ACETAMINOPHEN 5; 325 MG/1; MG/1
1 TABLET ORAL EVERY 6 HOURS PRN
Qty: 8 TABLET | Refills: 0 | Status: SHIPPED | OUTPATIENT
Start: 2022-10-29 | End: 2022-11-01

## 2022-10-29 RX ORDER — HYDROMORPHONE HYDROCHLORIDE 1 MG/ML
0.3 INJECTION, SOLUTION INTRAMUSCULAR; INTRAVENOUS; SUBCUTANEOUS ONCE
Status: COMPLETED | OUTPATIENT
Start: 2022-10-29 | End: 2022-10-29

## 2022-10-29 RX ORDER — ONDANSETRON 2 MG/ML
4 INJECTION INTRAMUSCULAR; INTRAVENOUS ONCE
Status: COMPLETED | OUTPATIENT
Start: 2022-10-29 | End: 2022-10-29

## 2022-10-29 RX ORDER — ONDANSETRON 2 MG/ML
INJECTION INTRAMUSCULAR; INTRAVENOUS
Status: COMPLETED
Start: 2022-10-29 | End: 2022-10-29

## 2022-10-29 RX ORDER — SODIUM CHLORIDE 9 MG/ML
1000 INJECTION, SOLUTION INTRAVENOUS CONTINUOUS
Status: DISCONTINUED | OUTPATIENT
Start: 2022-10-29 | End: 2022-10-29 | Stop reason: HOSPADM

## 2022-10-29 RX ORDER — ONDANSETRON 4 MG/1
8 TABLET, ORALLY DISINTEGRATING ORAL EVERY 8 HOURS PRN
Qty: 10 TABLET | Refills: 0 | Status: SHIPPED | OUTPATIENT
Start: 2022-10-29 | End: 2022-11-01

## 2022-10-29 RX ADMIN — ONDANSETRON 4 MG: 2 INJECTION INTRAMUSCULAR; INTRAVENOUS at 09:01

## 2022-10-29 RX ADMIN — SODIUM CHLORIDE 1000 ML: 9 INJECTION, SOLUTION INTRAVENOUS at 10:06

## 2022-10-29 RX ADMIN — SODIUM CHLORIDE 500 ML: 9 INJECTION, SOLUTION INTRAVENOUS at 07:39

## 2022-10-29 RX ADMIN — HYDROMORPHONE HYDROCHLORIDE 0.3 MG: 1 INJECTION, SOLUTION INTRAMUSCULAR; INTRAVENOUS; SUBCUTANEOUS at 12:03

## 2022-10-29 RX ADMIN — HYDROMORPHONE HYDROCHLORIDE 0.3 MG: 1 INJECTION, SOLUTION INTRAMUSCULAR; INTRAVENOUS; SUBCUTANEOUS at 07:44

## 2022-10-29 ASSESSMENT — ACTIVITIES OF DAILY LIVING (ADL)
ADLS_ACUITY_SCORE: 35

## 2022-10-29 NOTE — DISCHARGE INSTRUCTIONS
Return if symptoms worsen or new symptoms develop.  Follow-up with your primary care next week to follow-up on the hydronephrosis.  No stone was identified but there could have been a passed stone, a blood clot obstruction or other obstruction causing this this that could not be visualized.  If worsening pain vomiting fevers decreased urine output or other symptoms present please return to ED for further evaluation and care if unable to get into primary care follow-up with urgent care or ED for further evaluation.

## 2022-10-29 NOTE — ED TRIAGE NOTES
Pt here with L flank pain that started at 2000 last night. EMS gave Zofran and Toradol IV with relief.      Triage Assessment     Row Name 10/29/22 0556       Triage Assessment (Adult)    Airway WDL WDL       Respiratory WDL    Respiratory WDL WDL       Skin Circulation/Temperature WDL    Skin Circulation/Temperature WDL WDL       Cardiac WDL    Cardiac WDL WDL       Peripheral/Neurovascular WDL    Peripheral Neurovascular WDL WDL       Cognitive/Neuro/Behavioral WDL    Cognitive/Neuro/Behavioral WDL WDL

## 2022-10-29 NOTE — ED NOTES
AVS given and explained to pt . Pt verbalized understanding and denies having further questions. Pt to call herself a ride.

## 2022-10-31 ENCOUNTER — NURSE TRIAGE (OUTPATIENT)
Dept: NURSING | Facility: CLINIC | Age: 67
End: 2022-10-31

## 2022-10-31 ENCOUNTER — HOSPITAL ENCOUNTER (EMERGENCY)
Facility: CLINIC | Age: 67
Discharge: HOME | End: 2022-11-01
Attending: EMERGENCY MEDICINE | Admitting: EMERGENCY MEDICINE
Payer: COMMERCIAL

## 2022-10-31 DIAGNOSIS — R10.9 FLANK PAIN: ICD-10-CM

## 2022-10-31 LAB
ALBUMIN UR-MCNC: NEGATIVE MG/DL
ANION GAP SERPL CALCULATED.3IONS-SCNC: 13 MMOL/L (ref 7–15)
APPEARANCE UR: CLEAR
BASOPHILS # BLD AUTO: 0 10E3/UL (ref 0–0.2)
BASOPHILS NFR BLD AUTO: 0 %
BILIRUB UR QL STRIP: NEGATIVE
BUN SERPL-MCNC: 19.7 MG/DL (ref 8–23)
CALCIUM SERPL-MCNC: 9.3 MG/DL (ref 8.8–10.2)
CAOX CRY #/AREA URNS HPF: ABNORMAL /HPF
CHLORIDE SERPL-SCNC: 102 MMOL/L (ref 98–107)
COLOR UR AUTO: ABNORMAL
CREAT SERPL-MCNC: 1.41 MG/DL (ref 0.51–0.95)
DEPRECATED HCO3 PLAS-SCNC: 25 MMOL/L (ref 22–29)
EOSINOPHIL # BLD AUTO: 0 10E3/UL (ref 0–0.7)
EOSINOPHIL NFR BLD AUTO: 0 %
ERYTHROCYTE [DISTWIDTH] IN BLOOD BY AUTOMATED COUNT: 12.1 % (ref 10–15)
GFR SERPL CREATININE-BSD FRML MDRD: 41 ML/MIN/1.73M2
GLUCOSE SERPL-MCNC: 120 MG/DL (ref 70–99)
GLUCOSE UR STRIP-MCNC: NEGATIVE MG/DL
HCT VFR BLD AUTO: 40.6 % (ref 35–47)
HGB BLD-MCNC: 13.1 G/DL (ref 11.7–15.7)
HGB UR QL STRIP: ABNORMAL
HOLD SPECIMEN: NORMAL
HOLD SPECIMEN: NORMAL
HYALINE CASTS: 3 /LPF
IMM GRANULOCYTES # BLD: 0.1 10E3/UL
IMM GRANULOCYTES NFR BLD: 0 %
KETONES UR STRIP-MCNC: 80 MG/DL
LEUKOCYTE ESTERASE UR QL STRIP: NEGATIVE
LYMPHOCYTES # BLD AUTO: 0.6 10E3/UL (ref 0.8–5.3)
LYMPHOCYTES NFR BLD AUTO: 4 %
MCH RBC QN AUTO: 29.7 PG (ref 26.5–33)
MCHC RBC AUTO-ENTMCNC: 32.3 G/DL (ref 31.5–36.5)
MCV RBC AUTO: 92 FL (ref 78–100)
MONOCYTES # BLD AUTO: 0.8 10E3/UL (ref 0–1.3)
MONOCYTES NFR BLD AUTO: 6 %
MUCOUS THREADS #/AREA URNS LPF: PRESENT /LPF
NEUTROPHILS # BLD AUTO: 12.5 10E3/UL (ref 1.6–8.3)
NEUTROPHILS NFR BLD AUTO: 90 %
NITRATE UR QL: NEGATIVE
NRBC # BLD AUTO: 0 10E3/UL
NRBC BLD AUTO-RTO: 0 /100
PH UR STRIP: 5 [PH] (ref 5–7)
PLATELET # BLD AUTO: 189 10E3/UL (ref 150–450)
POTASSIUM SERPL-SCNC: 4.3 MMOL/L (ref 3.4–5.3)
RBC # BLD AUTO: 4.41 10E6/UL (ref 3.8–5.2)
RBC URINE: >182 /HPF
SODIUM SERPL-SCNC: 140 MMOL/L (ref 136–145)
SP GR UR STRIP: 1.03 (ref 1–1.03)
SQUAMOUS EPITHELIAL: 4 /HPF
UROBILINOGEN UR STRIP-MCNC: NORMAL MG/DL
WBC # BLD AUTO: 14 10E3/UL (ref 4–11)
WBC URINE: 15 /HPF

## 2022-10-31 PROCEDURE — 99285 EMERGENCY DEPT VISIT HI MDM: CPT | Mod: 25

## 2022-10-31 PROCEDURE — 81001 URINALYSIS AUTO W/SCOPE: CPT | Performed by: EMERGENCY MEDICINE

## 2022-10-31 PROCEDURE — 85025 COMPLETE CBC W/AUTO DIFF WBC: CPT | Performed by: EMERGENCY MEDICINE

## 2022-10-31 PROCEDURE — 99284 EMERGENCY DEPT VISIT MOD MDM: CPT | Performed by: EMERGENCY MEDICINE

## 2022-10-31 PROCEDURE — 36415 COLL VENOUS BLD VENIPUNCTURE: CPT | Performed by: EMERGENCY MEDICINE

## 2022-10-31 PROCEDURE — 87086 URINE CULTURE/COLONY COUNT: CPT | Performed by: EMERGENCY MEDICINE

## 2022-10-31 PROCEDURE — 80048 BASIC METABOLIC PNL TOTAL CA: CPT | Performed by: EMERGENCY MEDICINE

## 2022-10-31 PROCEDURE — 96374 THER/PROPH/DIAG INJ IV PUSH: CPT

## 2022-10-31 PROCEDURE — 96361 HYDRATE IV INFUSION ADD-ON: CPT

## 2022-10-31 ASSESSMENT — ACTIVITIES OF DAILY LIVING (ADL)
ADLS_ACUITY_SCORE: 35

## 2022-10-31 NOTE — ED TRIAGE NOTES
Pt seen in ED last week for flank pain, dx with hydronephrosis. Pts pain has not improved and only seems to have gotten worse. Rx for oxycodone only helps a little.   Writer and provider spoke with pt about treatment and diagnostic options in ED vs. UC. Pt agreed to be evaluated in the ED.

## 2022-10-31 NOTE — TELEPHONE ENCOUNTER
"Triage Call:     Pt calling to report that she was seen in the ED on 10/29/2022 for a Kidney stone  Passed the stone   She still has \"Water on the kidneys\" that is causing pain    Pain: 8-9/10 earlier today   Took a percocet   Pain now: 4-5/10    Able to empty bladder  No fever  No blood in her urine.     Disposition: See in office today. Pt does not have a PCP within [facility] so she was advised to contact her PCP at [facility] and see if she can either get in to see her PCP or get advisement on if she should go to the [facility] or ED. She verbalized agreement with this plan. She was also given care advice per the protocol.     Maryana Duarte RN  [facility] Nurse Advisor 2:15 PM 10/31/2022      Reason for Disposition    MODERATE pain (e.g., interferes with normal activities or awakens from sleep)    Additional Information    Negative: Passed out (i.e., lost consciousness, collapsed and was not responding)    Negative: Shock suspected (e.g., cold/pale/clammy skin, too weak to stand, low BP, rapid pulse)    Negative: Sounds like a life-threatening emergency to the triager    Negative: Followed a major injury to the back (e.g., MVA, fall > 10 feet or 3 meters, penetrating injury, etc.)    Negative: Upper, mid or lower back pain that occurs mainly in the midline    Negative: Sudden onset of severe flank pain and age > 60 years    Negative: Abdominal pain and age > 60 years    Negative: Unable to urinate (or only a few drops) > 4 hours and bladder feels very full (e.g., palpable bladder or strong urge to urinate)    Negative: Pain radiates into groin, scrotum    Negative: Blood in urine (red, pink, or tea-colored)    Negative: Vomiting    Negative: Weakness of a leg or foot (e.g., unable to bear weight, dragging foot)    Negative: Patient sounds very sick or weak to the triager    Negative: Fever > 100.4 F (38.0 C)    Negative: Pain or burning with passing urine (urination)    Negative: SEVERE pain (e.g., excruciating, scale 8-10) " and present > 1 hour    Protocols used: FLANK PAIN-A-OH

## 2022-11-01 ENCOUNTER — APPOINTMENT (OUTPATIENT)
Dept: CT IMAGING | Facility: CLINIC | Age: 67
End: 2022-11-01
Attending: EMERGENCY MEDICINE
Payer: COMMERCIAL

## 2022-11-01 VITALS
SYSTOLIC BLOOD PRESSURE: 130 MMHG | WEIGHT: 183 LBS | RESPIRATION RATE: 18 BRPM | BODY MASS INDEX: 27.83 KG/M2 | HEART RATE: 88 BPM | DIASTOLIC BLOOD PRESSURE: 72 MMHG | OXYGEN SATURATION: 97 % | TEMPERATURE: 99.8 F

## 2022-11-01 PROCEDURE — 250N000011 HC RX IP 250 OP 636: Performed by: EMERGENCY MEDICINE

## 2022-11-01 PROCEDURE — 258N000003 HC RX IP 258 OP 636: Performed by: EMERGENCY MEDICINE

## 2022-11-01 PROCEDURE — 250N000013 HC RX MED GY IP 250 OP 250 PS 637: Performed by: EMERGENCY MEDICINE

## 2022-11-01 PROCEDURE — 74176 CT ABD & PELVIS W/O CONTRAST: CPT

## 2022-11-01 RX ORDER — TAMSULOSIN HYDROCHLORIDE 0.4 MG/1
0.4 CAPSULE ORAL DAILY
Qty: 20 CAPSULE | Refills: 0 | Status: SHIPPED | OUTPATIENT
Start: 2022-11-01 | End: 2022-11-14

## 2022-11-01 RX ORDER — CEPHALEXIN 500 MG/1
500 CAPSULE ORAL 2 TIMES DAILY
Qty: 20 CAPSULE | Refills: 0 | Status: SHIPPED | OUTPATIENT
Start: 2022-11-01 | End: 2022-11-11

## 2022-11-01 RX ORDER — ONDANSETRON 4 MG/1
4 TABLET, ORALLY DISINTEGRATING ORAL EVERY 8 HOURS PRN
Qty: 20 TABLET | Refills: 0 | Status: SHIPPED | OUTPATIENT
Start: 2022-11-01 | End: 2022-11-08

## 2022-11-01 RX ORDER — TAMSULOSIN HYDROCHLORIDE 0.4 MG/1
0.4 CAPSULE ORAL ONCE
Status: COMPLETED | OUTPATIENT
Start: 2022-11-01 | End: 2022-11-01

## 2022-11-01 RX ORDER — KETOROLAC TROMETHAMINE 15 MG/ML
15 INJECTION, SOLUTION INTRAMUSCULAR; INTRAVENOUS ONCE
Status: COMPLETED | OUTPATIENT
Start: 2022-11-01 | End: 2022-11-01

## 2022-11-01 RX ORDER — OXYCODONE HYDROCHLORIDE 5 MG/1
5 TABLET ORAL EVERY 6 HOURS PRN
Qty: 15 TABLET | Refills: 0 | Status: SHIPPED | OUTPATIENT
Start: 2022-11-01 | End: 2022-11-04

## 2022-11-01 RX ADMIN — KETOROLAC TROMETHAMINE 15 MG: 15 INJECTION, SOLUTION INTRAMUSCULAR; INTRAVENOUS at 01:51

## 2022-11-01 RX ADMIN — SODIUM CHLORIDE, POTASSIUM CHLORIDE, SODIUM LACTATE AND CALCIUM CHLORIDE 1000 ML: 600; 310; 30; 20 INJECTION, SOLUTION INTRAVENOUS at 01:52

## 2022-11-01 RX ADMIN — TAMSULOSIN HYDROCHLORIDE 0.4 MG: 0.4 CAPSULE ORAL at 03:00

## 2022-11-01 ASSESSMENT — ENCOUNTER SYMPTOMS
NAUSEA: 1
FEVER: 0
FLANK PAIN: 1

## 2022-11-01 ASSESSMENT — ACTIVITIES OF DAILY LIVING (ADL)
ADLS_ACUITY_SCORE: 35
ADLS_ACUITY_SCORE: 35

## 2022-11-01 NOTE — ED NOTES
"Pt arrived via triage, 20g IV in place, and labs results are back.  Pt just gave Urine sample on arrival to room.        Pt states she was seen here on Saturday for severe N and V and L sided flank pain.  Pt states CT did not show a stone, but \"it was dilated\",and they assumed she just passed a stone.  Pt does have a h/o stones in the past x 4.  Pt report Sunday she felt better and then the pain continued to increase, despite percocet.  Currently pain is 8/10.  She states \"I have not eaten since Friday, I'm not drinking like I should\" due to pain.  Pt has not had a BM since Friday either.  Pt admits to burning with urination just now.     Abd: Obese, soft, +BS x 4.  Pain with palpation in LLQ, and with percussion to L flank.  MD in room for assessment and exam.  1 lt NS was hung WO.   CT ordered and pt has been to and from.  PLAN:  Will reassess and await test results.   "

## 2022-11-01 NOTE — DISCHARGE INSTRUCTIONS
I feel pretty confident that you have a kidney stone.  However, I think it is something called a radiolucent stone, and he cannot be seen in CT.  There is a lot of blood in your urine.  There is also evidence on the CT that you have a kidney stone.  However, we cannot see it.  I am prescribing you pain medicine.  It is a narcotic, so do not mix it with driving, alcohol, or other sedating medication.  It can make you constipated, so take MiraLax twice a day when you're taking it. I also prescribed some nausea medication.  I also prescribed a medication called Flomax, which the urologists like for kidney stones.  It can drop your blood pressure, so be careful when you take it.  I also prescribed some antibiotics to keep this from getting infected.  I put in a referral to urology, and asked them to get you an urgent appointment.      I want you to take 600 mg of ibuprofen every 6 hours for the pain. I think this will help you more than the oxycodone I prescribed.     If your pain gets worse, if you develop a fever, or if you have any new concerns, I would strongly encourage you to go to the [facility] emergency room or the [facility] emergency room, where they have a urologist on-call.  We do not have a urologist on-call here.      If you do not think you make it, you are more than welcome to return to this ER.  However, we do not have a urologist on-call here.    I hope you passed the stone soon, and I am sorry that you are dealing with this.

## 2022-11-01 NOTE — ED PROVIDER NOTES
History     Chief Complaint   Patient presents with     Flank Pain     HPI  Irena Lopez is a 66 year old female who presents with flank pain.  It is on the left.  Patient was seen here 2 days ago for the same thing.  CT scan at that time showed some hydronephrosis without a stone.  Thought was that it might be a recently passed stone.  She says her symptoms have continued.  She is not on antibiotics.  Ongoing nausea. No fevers.     Allergies:  Allergies   Allergen Reactions     Ambrosia Artemisiifolia (Ragweed) Other (See Comments)     Runny nose, eye irritation     Fluoxetine Other (See Comments)     Symptoms consistent with serotonin syndrome.  Has tolerated other SSRIs without similar reaction.        Problem List:    Patient Active Problem List    Diagnosis Date Noted     Small bowel perforation (H) 10/19/2016     Priority: Medium     Abdominal abscess 10/19/2016     Priority: Medium     Hyperlipidemia 11/05/2013     Priority: Medium     Recurrent major depressive episodes (H) 04/16/2010     Priority: Medium     Migraines 03/02/2010     Priority: Medium     Allergic state 03/02/2010     Priority: Medium     (Problem list name updated by automated process. Provider to review and confirm.)          Past Medical History:    Past Medical History:   Diagnosis Date     Anxiety      Depressive disorder      Migraine        Past Surgical History:    Past Surgical History:   Procedure Laterality Date     APPENDECTOMY         Family History:    Family History   Problem Relation Age of Onset     Arrhythmia Mother      Diabetes Father      Gout Father      Coronary Artery Disease Father        Social History:  Marital Status:  Single [1]  Social History     Tobacco Use     Smoking status: Never   Substance Use Topics     Alcohol use: No     Alcohol/week: 0.0 standard drinks     Comment: rarely     Drug use: No        Medications:    cephALEXin (KEFLEX) 500 MG capsule  ondansetron (ZOFRAN ODT) 4 MG ODT tab  tamsulosin  (FLOMAX) 0.4 MG capsule  ASTELIN 137 MCG/SPRAY NA SOLN  atorvastatin (LIPITOR) 20 MG tablet  Cholecalciferol (VITAMIN D) 400 UNIT capsule  Coenzyme Q10 (COQ10 PO)  FISH OIL 1000 MG OR CAPS  fluticasone (FLONASE) 50 MCG/ACT nasal spray  Glucosamine-Chondroitin--400-300 MG TABS  MAGNESIUM OXIDE PO  METOCLOPRAMIDE HCL 10 MG OR TABS  MULTIVITAMINS OR TABS  NAPROXEN SODIUM 550 MG OR TABS  NORTRIPTYLINE HCL 50 MG OR CAPS  PATANOL 0.1 % OP SOLN  RELPAX 40 MG OR TABS  sumatriptan (IMITREX) 100 MG tablet  SUPER B COMPLEX/C PO  TYLENOL WITH CODEINE #3 300-30 MG OR TABS  VERAPAMIL HCL ER PO  ZYRTEC 10 MG OR TABS          Review of Systems   Constitutional: Negative for fever.   Gastrointestinal: Positive for nausea.   Genitourinary: Positive for flank pain.   All other systems reviewed and are negative.      Physical Exam   BP: 134/57  Pulse: 85  Temp: 98.5  F (36.9  C)  Resp: 18  Weight: 83 kg (183 lb)  SpO2: 97 %      Physical Exam  Vitals reviewed.   Constitutional:       General: She is not in acute distress.     Appearance: She is not toxic-appearing.   HENT:      Head: Normocephalic.      Right Ear: External ear normal.      Left Ear: External ear normal.      Nose: No congestion.      Mouth/Throat:      Mouth: Mucous membranes are moist.   Eyes:      General:         Right eye: No discharge.         Left eye: No discharge.      Extraocular Movements: Extraocular movements intact.   Cardiovascular:      Rate and Rhythm: Normal rate.      Pulses: Normal pulses.   Pulmonary:      Effort: No respiratory distress.   Abdominal:      General: There is no distension.      Tenderness: There is left CVA tenderness. There is no right CVA tenderness.      Comments: Left cva ttp    Musculoskeletal:      Cervical back: No rigidity.   Skin:     Capillary Refill: Capillary refill takes less than 2 seconds.      Coloration: Skin is not jaundiced.   Neurological:      General: No focal deficit present.      Mental Status: She is  alert.   Psychiatric:      Comments: Very nice         ED Course              ED Course as of 11/06/22 0048   Tue Nov 01, 2022   0004 Large amount of blood in urine.   0051 CT shows                                                                   IMPRESSION:   1.  Unchanged moderate left hydroureteronephrosis, with increased moderate perinephric stranding. No obstructing calculus identified. Given persistent finding, this may be due to obstructing radiolucent calculus or ureteral inflammation from recently   passed calculus.     2.  No intrarenal calculi.      0051 Giving Toradol to see if this relieves the patient pain.   0128 Given the blood in the urine with not a large amount of white cells, I do not think this is an infected stone.  I think this is a kidney stone.  I do not think this pyelonephritis.  If I can get the pain under control, I am going to give her antibiotics out of precaution and have her follow-up with urology.   0248 I talked to the patient.  Her pain totally resolved with Toradol.  I think she has a kidney stone based on her urinalysis, her imaging, and the fact that her pain got better with Toradol.  We do not have a urologist on-call here.  So I put in a referral.  I think she needs cystoscopy.  I Lidia give her antibiotics out of caution to prevent it from getting infected.  I prescribed Flomax.  I prescribed oxycodone, and I prescribed Zofran.  I discuss precautions when taking narcotics and she expressed understanding, and these included not driving, not drinking alcohol, not mixing with other sedating medications.  The risk of constipation and addiction was also discussed.     Procedures                  No results found for this or any previous visit (from the past 24 hour(s)).    Medications   ketorolac (TORADOL) injection 15 mg (15 mg Intravenous Given 11/1/22 0151)   lactated ringers BOLUS 1,000 mL (0 mLs Intravenous Stopped 11/1/22 0308)   tamsulosin (FLOMAX) capsule 0.4 mg (0.4 mg  Oral Given 11/1/22 0300)       Assessments & Plan (with Medical Decision Making)       Possible pyelonephritis, possible ureteral stone, consider radiolucent stone. will get CT stone protocol. Will get labs, will get UA. Will monitor closely. No fevers, reassuring.     I have reviewed the nursing notes.    I have reviewed the findings, diagnosis, plan and need for follow up with the patient.  This note was in part created using dictation software in an effort to speed and improve patient care; any typos should be read with the frequent shortcomings of this technology in mind.    Discharge Medication List as of 11/1/2022  3:09 AM      START taking these medications    Details   cephALEXin (KEFLEX) 500 MG capsule Take 1 capsule (500 mg) by mouth 2 times daily for 10 days, Disp-20 capsule, R-0, E-Prescribe      !! ondansetron (ZOFRAN ODT) 4 MG ODT tab Take 1 tablet (4 mg) by mouth every 8 hours as needed for nausea or vomiting, Disp-20 tablet, R-0, E-Prescribe      oxyCODONE (ROXICODONE) 5 MG tablet Take 1 tablet (5 mg) by mouth every 6 hours as needed for pain, Disp-15 tablet, R-0, E-Prescribe      tamsulosin (FLOMAX) 0.4 MG capsule Take 1 capsule (0.4 mg) by mouth daily for 20 doses, Disp-20 capsule, R-0, E-Prescribe       !! - Potential duplicate medications found. Please discuss with provider.          Final diagnoses:   Flank pain       10/31/2022   [facility] EMERGENCY DEPT     Ibrahima Mckeon MD  11/06/22 0049

## 2022-11-02 ENCOUNTER — VIRTUAL VISIT (OUTPATIENT)
Dept: UROLOGY | Facility: CLINIC | Age: 67
End: 2022-11-02
Payer: COMMERCIAL

## 2022-11-02 DIAGNOSIS — R10.9 ACUTE LEFT FLANK PAIN: Primary | ICD-10-CM

## 2022-11-02 DIAGNOSIS — N13.30 HYDRONEPHROSIS, UNSPECIFIED HYDRONEPHROSIS TYPE: ICD-10-CM

## 2022-11-02 LAB — BACTERIA UR CULT: NORMAL

## 2022-11-02 PROCEDURE — 99203 OFFICE O/P NEW LOW 30 MIN: CPT | Mod: 95 | Performed by: PHYSICIAN ASSISTANT

## 2022-11-02 ASSESSMENT — PAIN SCALES - GENERAL: PAINLEVEL: MILD PAIN (3)

## 2022-11-02 NOTE — PROGRESS NOTES
Patient is roomed via telephone for a virtual visit.  Patient confirmed she is in the [facility] at the time of this appointment.  Patient understands that this visit is billable and agree to proceed with appointment.

## 2022-11-02 NOTE — RESULT ENCOUNTER NOTE
Final urine culture report is negative.  Adult Negative Urine culture parameters per protocol: Any # Urogenital single or mixed organism, <10,000 col/ml single organism (cath/midstream), and > 3 organisms (No susceptibilities performed).  [facility] Emergency Dept discharge antibiotic prescribed (If applicable): Cephalexin  Treatment recommendations per [facility] ED Lab Result Urine Culture protocol.

## 2022-11-02 NOTE — PROGRESS NOTES
Assessment/Plan:    Assessment & Plan   Irena was seen today for new patient.    Diagnoses and all orders for this visit:    Acute left flank pain    Hydronephrosis, unspecified hydronephrosis type    Other orders  -     Adult Urology  Referral    Stone Management Plan  Stone Management 11/2/2022   Urinary Tract Infection No suspicion of infection   Current CT date 10/31/2022   Right sided stones? No   R Stone Event No current event   Left sided stones? No   L Hydronephrosis Moderate   L Stone Event New event   Diagnosis date 10/29/2022   L Current Plan Observe   Observe rationale Other         PLAN    67 yo F with history of kidney stones, previous stone passage events. Recent bounce back ER visit for persistent left flank pain with hydronephrosis, etiology unclear.     Will proceed with further imaging given unspecified hydronephrosis.     For symptom control, she was prescribed oxycodone and zofran. Recommend she discontinue flomax. Can stop antibiotic given negative urine culture. Over the counter symptom control medications of ibuprofen, Dramamine and Tylenol were recommended.    Video call duration: 26 minutes  32 minutes spent on the date of the encounter doing chart review, history and exam, documentation and further activities per the note    Jody Harry PA-C  [facility]    Subjective:     HPI  Ms. Irena Lopez is a 66 year old  female who is being evaluated via a billable video visit by [facility] following [place] ER visit for urolithiasis.     She is a recurrent unidentified composition stone former who has not required stone clearance procedures. She has not previously participated in stone risk evaluation. She has no identified modifiable stone risk factors. She has no identified non-modifiable stone risk factors.    She was seen in ER 10/29/22 for acute left flank pain. CT revealed left hydronephrosis without  urolithiasis. A recent stone passage event was suspected. She returned to ER for persistent pain and nausea. Repeat CT revealed ongoing left hydronephrosis. Labs were negative for acute infection or renal injury. She was discharged with keflex, zofran, flomax, and oxycodone.    She is doing better, left lower abdomina and left flank pain is managed with ibuprofen, currently 2/10. At its worst, was 10/10. Had hematuria and dysuria, now resolved. She denies current symptoms of fever, chills, nausea, vomiting, urinary frequency and dysuria.     CT scan from 10/31/22 is personally reviewed and demonstrates moderate left hydronephrosis with perinephric stranding. No ureteral or bladder stones. Similar findings noted on CT from 10/29/22 and 7/14/2017. A previous right distal ureteral stone from 2015 absent.     Significant labs from presentation include severe hematuria, mild pyuria, negative nitrite, non-pathogenic organisms on urine culture, elevated WBC, slightly elevated creatinine and normal potassium.    ROS   A 12 point comprehensive review of systems is negative except for HPI    Past Medical History:   Diagnosis Date     Anxiety      Depressive disorder      Migraine      Past Surgical History:   Procedure Laterality Date     APPENDECTOMY       Current Outpatient Medications   Medication Sig Dispense Refill     ASTELIN 137 MCG/SPRAY NA SOLN USE 1-2 SPRAYS IN EACH NOSTRIL TWICE DAILY as needed for severe allergy symptoms 1 Bottle 0     atorvastatin (LIPITOR) 20 MG tablet Take 20 mg by mouth every evening        cephALEXin (KEFLEX) 500 MG capsule Take 1 capsule (500 mg) by mouth 2 times daily for 10 days 20 capsule 0     Cholecalciferol (VITAMIN D) 400 UNIT capsule Take 1 capsule by mouth daily.       Coenzyme Q10 (COQ10 PO) Take 200 mg by mouth every evening       FISH OIL 1000 MG OR CAPS ONE DAILY 30 Cap 0     fluticasone (FLONASE) 50 MCG/ACT nasal spray Spray 1 spray into both nostrils 2 times daily        Glucosamine-Chondroitin--400-300 MG TABS Take 1 tablet by mouth daily.       MAGNESIUM OXIDE PO Take 400 mg by mouth every evening       METOCLOPRAMIDE HCL 10 MG OR TABS 1 TABLET by mouth daily AS NEEDED 30 Tab      MULTIVITAMINS OR TABS ONE DAILY 100 Tab      NAPROXEN SODIUM 550 MG OR TABS 1 TABLET TWICE DAILY PRN 60 Tab      NORTRIPTYLINE HCL 50 MG OR CAPS Take  mg by mouth At Bedtime  90 Cap      ondansetron (ZOFRAN ODT) 4 MG ODT tab Take 1 tablet (4 mg) by mouth every 8 hours as needed for nausea or vomiting 20 tablet 0     oxyCODONE (ROXICODONE) 5 MG tablet Take 1 tablet (5 mg) by mouth every 6 hours as needed for pain 15 tablet 0     PATANOL 0.1 % OP SOLN 1 gtt each eye BID prn 1 Bottle      RELPAX 40 MG OR TABS ONE TABLET WITH ONSET OF MIGRAINE, MAY REPEAT ONCE AFTER 2 HOURS. DO NOT EXCEED 2 TABLETS IN 24 HOURS. 6 Tab      sumatriptan (IMITREX) 100 MG tablet Take 100 mg by mouth at onset of headache. May repeat in 2 hours if needed: max 2/day; average number of headaches monthly: 4       SUPER B COMPLEX/C PO Take 1 tablet by mouth every evening       tamsulosin (FLOMAX) 0.4 MG capsule Take 1 capsule (0.4 mg) by mouth daily for 20 doses 20 capsule 0     TYLENOL WITH CODEINE #3 300-30 MG OR TABS ONE TABLET EVERY 6 HOURS AS NEEDED 28 Tab      VERAPAMIL HCL ER PO Take 120 mg by mouth daily       ZYRTEC 10 MG OR TABS ONE TABLET DAILY in pm 30 Tab 0       Allergies   Allergen Reactions     Ambrosia Artemisiifolia (Ragweed) Other (See Comments)     Runny nose, eye irritation     Fluoxetine Other (See Comments)     Symptoms consistent with serotonin syndrome.  Has tolerated other SSRIs without similar reaction.        Social History     Socioeconomic History     Marital status: Single     Spouse name: Not on file     Number of children: Not on file     Years of education: Not on file     Highest education level: Not on file   Occupational History     Not on file   Tobacco Use     Smoking status: Never      Smokeless tobacco: Not on file   Substance and Sexual Activity     Alcohol use: No     Alcohol/week: 0.0 standard drinks     Comment: rarely     Drug use: No     Sexual activity: Not on file   Other Topics Concern     Not on file   Social History Narrative     Not on file     Social Determinants of Health     Financial Resource Strain: Not on file   Food Insecurity: Not on file   Transportation Needs: Not on file   Physical Activity: Not on file   Stress: Not on file   Social Connections: Not on file   Intimate Partner Violence: Not on file   Housing Stability: Not on file       Family History   Problem Relation Age of Onset     Arrhythmia Mother      Diabetes Father      Gout Father      Coronary Artery Disease Father        Objective:     No vitals or physical exam obtained due to virtual visit    LABS  7-Day Micro Results     Collected Updated Procedure Result Status      10/31/2022 2257 11/02/2022 0609 Urine Culture [[id number]]   Urine, Midstream    Final result Component Value   Culture <10,000 CFU/mL Mixture of urogenital sacha                   Most Recent Urinalysis:  Recent Labs   Lab Test 10/31/22  2257   COLOR Dark Yellow*   APPEARANCE Clear   URINEGLC Negative   URINEBILI Negative   URINEKETONE 80*   SG 1.030   UBLD Large*   URINEPH 5.0   PROTEIN Negative   NITRITE Negative   LEUKEST Negative   RBCU >182*   WBCU 15*     Acute Labs   Urine Culture    Culture   Date Value Ref Range Status   10/31/2022 <10,000 CFU/mL Mixture of urogenital sacha  Final

## 2022-11-04 ENCOUNTER — TELEPHONE (OUTPATIENT)
Dept: UROLOGY | Facility: CLINIC | Age: 67
End: 2022-11-04

## 2022-11-04 NOTE — TELEPHONE ENCOUNTER
Message left for patient to call back to schedule a CT w/contrast and a virtual follow up.  Please transfer patient to clinic staff when calls back.  Irma Horton RN

## 2022-11-14 ENCOUNTER — HOSPITAL ENCOUNTER (OUTPATIENT)
Dept: CT IMAGING | Facility: CLINIC | Age: 67
Discharge: HOME | End: 2022-11-14
Attending: PHYSICIAN ASSISTANT | Admitting: PHYSICIAN ASSISTANT
Payer: COMMERCIAL

## 2022-11-14 ENCOUNTER — VIRTUAL VISIT (OUTPATIENT)
Dept: UROLOGY | Facility: CLINIC | Age: 67
End: 2022-11-14
Payer: COMMERCIAL

## 2022-11-14 DIAGNOSIS — N13.30 HYDRONEPHROSIS, UNSPECIFIED HYDRONEPHROSIS TYPE: ICD-10-CM

## 2022-11-14 DIAGNOSIS — N13.30 HYDRONEPHROSIS, UNSPECIFIED HYDRONEPHROSIS TYPE: Primary | ICD-10-CM

## 2022-11-14 LAB
CREAT BLD-MCNC: 0.8 MG/DL (ref 0.5–1)
GFR SERPL CREATININE-BSD FRML MDRD: >60 ML/MIN/1.73M2

## 2022-11-14 PROCEDURE — 250N000009 HC RX 250: Performed by: RADIOLOGY

## 2022-11-14 PROCEDURE — 250N000011 HC RX IP 250 OP 636: Performed by: RADIOLOGY

## 2022-11-14 PROCEDURE — 99213 OFFICE O/P EST LOW 20 MIN: CPT | Mod: 95 | Performed by: PHYSICIAN ASSISTANT

## 2022-11-14 PROCEDURE — 74178 CT ABD&PLV WO CNTR FLWD CNTR: CPT

## 2022-11-14 PROCEDURE — 82565 ASSAY OF CREATININE: CPT

## 2022-11-14 RX ORDER — IOPAMIDOL 755 MG/ML
81 INJECTION, SOLUTION INTRAVASCULAR ONCE
Status: COMPLETED | OUTPATIENT
Start: 2022-11-14 | End: 2022-11-14

## 2022-11-14 RX ADMIN — IOPAMIDOL 81 ML: 755 INJECTION, SOLUTION INTRAVENOUS at 10:27

## 2022-11-14 RX ADMIN — SODIUM CHLORIDE 100 ML: 9 INJECTION, SOLUTION INTRAVENOUS at 10:27

## 2022-11-14 ASSESSMENT — PAIN SCALES - GENERAL: PAINLEVEL: NO PAIN (0)

## 2022-11-14 NOTE — PROGRESS NOTES
Patient is roomed via telephone for a virtual visit.  Patient confirmed she is in the [facility] at the time of this appointment.  Patient understands that this visit is billable and agree to proceed with appointment.

## 2022-11-14 NOTE — PROGRESS NOTES
Assessment/Plan:    Assessment & Plan   Irena was seen today for follow up.    Diagnoses and all orders for this visit:    Hydronephrosis, unspecified hydronephrosis type-resolved    Stone Management Plan  Stone Management 11/2/2022 11/14/2022   Urinary Tract Infection No suspicion of infection No suspicion of infection   Renal Colic - Asymptomatic at this time   Renal Failure - No suspicion of renal failure   Current CT date 10/31/2022 11/14/2022   Right sided stones? No No   R Stone Event No current event No current event   Left sided stones? No No   L Hydronephrosis Moderate None   L Stone Event New event Resolved event   Diagnosis date 10/29/2022 11/14/2022   L Current Plan Observe -   Observe rationale Other -         PLAN    67 yo F with history of kidney stones, previous stone passage events. Previous ER visits for persistent left flank pain with associated hydronephrosis. Follow up urogram shows no residual obstruction or urothelial masses.      She has no current stone burden and is comfortable following up on a prn basis. Stone prevention measures reviewed with patient. Patient verbalized understanding. Patient agrees with plan as discussed.     Video call duration: 15 minutes  Provider off-site  21 minutes spent on the date of the encounter doing chart review, history and exam, documentation and further activities per the note    Jody Harry PA-C  [facility]    HPI  Ms. Irena Lopez is a 67 year old  female who is being evaluated via a billable video visit by [facility] for follow up of indeterminate left hydronephrosis.    She is a recurrent unidentified composition stone former who has not required stone clearance procedures. She has not previously participated in stone risk evaluation. She has no identified modifiable stone risk factors. She has no identified non-modifiable stone risk factors.    She hasn't had any more  pain following out last encounter. She denies symptoms of fever, chills, flank pain, nausea, vomiting, urinary frequency and dysuria.     CT scan urogram from today is personally reviewed and demonstrates resolution of previous left hydronephrosis. No suspicious findings within collecting systems, ureters, or bladder. Small, bilateral renal cysts.    ROS   Review of systems is negative except for HPI.    Past Medical History:   Diagnosis Date     Anxiety      Depressive disorder      Migraine        Past Surgical History:   Procedure Laterality Date     APPENDECTOMY         Current Outpatient Medications   Medication Sig Dispense Refill     ASTELIN 137 MCG/SPRAY NA SOLN USE 1-2 SPRAYS IN EACH NOSTRIL TWICE DAILY as needed for severe allergy symptoms 1 Bottle 0     atorvastatin (LIPITOR) 20 MG tablet Take 20 mg by mouth every evening        Cholecalciferol (VITAMIN D) 400 UNIT capsule Take 1 capsule by mouth daily.       Coenzyme Q10 (COQ10 PO) Take 200 mg by mouth every evening       FISH OIL 1000 MG OR CAPS ONE DAILY 30 Cap 0     fluticasone (FLONASE) 50 MCG/ACT nasal spray Spray 1 spray into both nostrils 2 times daily       Glucosamine-Chondroitin--400-300 MG TABS Take 1 tablet by mouth daily.       MAGNESIUM OXIDE PO Take 400 mg by mouth every evening       METOCLOPRAMIDE HCL 10 MG OR TABS 1 TABLET by mouth daily AS NEEDED 30 Tab      MULTIVITAMINS OR TABS ONE DAILY 100 Tab      NAPROXEN SODIUM 550 MG OR TABS 1 TABLET TWICE DAILY PRN 60 Tab      NORTRIPTYLINE HCL 50 MG OR CAPS Take  mg by mouth At Bedtime  90 Cap      PATANOL 0.1 % OP SOLN 1 gtt each eye BID prn 1 Bottle      RELPAX 40 MG OR TABS ONE TABLET WITH ONSET OF MIGRAINE, MAY REPEAT ONCE AFTER 2 HOURS. DO NOT EXCEED 2 TABLETS IN 24 HOURS. 6 Tab      sumatriptan (IMITREX) 100 MG tablet Take 100 mg by mouth at onset of headache. May repeat in 2 hours if needed: max 2/day; average number of headaches monthly: 4       SUPER B COMPLEX/C PO Take  1 tablet by mouth every evening       VERAPAMIL HCL ER PO Take 120 mg by mouth daily       ZYRTEC 10 MG OR TABS ONE TABLET DAILY in pm 30 Tab 0       Allergies   Allergen Reactions     Ambrosia Artemisiifolia (Ragweed) Other (See Comments)     Runny nose, eye irritation     Fluoxetine Other (See Comments)     Symptoms consistent with serotonin syndrome.  Has tolerated other SSRIs without similar reaction.        Social History     Socioeconomic History     Marital status: Single     Spouse name: Not on file     Number of children: Not on file     Years of education: Not on file     Highest education level: Not on file   Occupational History     Not on file   Tobacco Use     Smoking status: Never     Smokeless tobacco: Not on file   Substance and Sexual Activity     Alcohol use: No     Alcohol/week: 0.0 standard drinks     Comment: rarely     Drug use: No     Sexual activity: Not on file   Other Topics Concern     Not on file   Social History Narrative     Not on file     Social Determinants of Health     Financial Resource Strain: Not on file   Food Insecurity: Not on file   Transportation Needs: Not on file   Physical Activity: Not on file   Stress: Not on file   Social Connections: Not on file   Intimate Partner Violence: Not on file   Housing Stability: Not on file       Family History   Problem Relation Age of Onset     Arrhythmia Mother      Diabetes Father      Gout Father      Coronary Artery Disease Father        Objective:     Appears AAO x 3  No vitals obtained due to virtual visit

## 2022-11-14 NOTE — PATIENT INSTRUCTIONS
Calcium Oxalate Stone Prevention Self Management    Drink more fluids:    Drinking more liquids is the best way you can help prevent future stones. Stones can form when substances in the urine are too concentrated. The more you drink, the more urine you will make. This means all substances in the urine will be less concentrated.    How much urine should I be producing?    The usual recommended daily urine production is about 2 to 3 quarts (6681-0963 ml). If you are producing more than 3 quarts of urine on a regular basis, it is possible to deplete important minerals stored in the body.    To measure the amount of urine you produce in a day, you can either:  Collect all urine in a container and measure at the end of the day   Use a measuring cup each time you urinate and add up the amounts at the end of the day     Observe  Color - Dark mellisa urine is concentrated. Light straw color or lighter is dilute and desirable   Odor - Concentrated urine tends to smell stronger. Dilute urine is nearly odorless    Ways to increase your fluid intake    Increasing the amount of fluid you drink is effective for all types of kidney stones. While water is commonly recommended, all fluids are effective for increasing the amount of urine your body produces.  Focus on starting a lifelong habit, rather than a short-term solution.   Keep liquids on hand that you like. Crystal Light is a low calorie appropriate choice.  Drink out of larger glasses. You'll tend to drink more with each serving.   Have an additional glass of fluid with each meal.   Keep a water or drink bottle at work and fill it regularly.     *If you are prone to fluid retention, consult your doctor before making changes to your fluid habits.    Low Oxalate Diet:    Avoid excess amounts or daily consumption of these foods:  All nuts and nut products including peanuts, almonds, pecans, peanut butter, almond milk  Rhubarb  Chocolate  Soybeans and soy products    Spinach  Wheat Germ  Beets    Maintain a normal calcium diet:    Researches have found that people with low calcium intakes tend to have more stones. Foods with high calcium content are acceptable and include:  Dairy products (including milk, cheese and yogurt)  Meat and fish  Enriched cereals  Dark green vegetables    What about calcium supplements?     Many people take calcium supplements, either on their own or as prescribed by a doctor. Research has indicated that calcium supplements do not usually pose a risk for stone formation.  Calcium citrate is a better choice for a supplement.    Avoid excess salt:    Salt (sodium chloride) is found in abundance in many foods. High sodium levels in the urine can interfere with the kidney's handling of calcium.     Tips for reducing the salt in your diet:  Don't use salt at the table  Reduce the salt used in food preparation. Try 1/2 teaspoon when recipes call for 1 teaspoon.  Use herbs and spices for flavoring instead of salt.  Avoid salty foods.  Check the label before you buy or use a product. Note sodium and portion size information.  Try to consume less than 2,000 mg/day. (1 teaspoon = 2,000 mg)    Foods with high sodium content include:  Processed meat (including luncheon meats, sausage)   Crackers   Instant cereal   Processed cheese   Canned soups   Chips and snack foods   Soy sauce    [facility] can respond to your questions or concerns 24 hours a day at [phone].

## 2023-11-07 ENCOUNTER — HOSPITAL ENCOUNTER (EMERGENCY)
Facility: CLINIC | Age: 68
Discharge: HOME | End: 2023-11-07
Payer: MEDICARE

## 2023-11-07 VITALS
SYSTOLIC BLOOD PRESSURE: 159 MMHG | DIASTOLIC BLOOD PRESSURE: 84 MMHG | OXYGEN SATURATION: 98 % | TEMPERATURE: 97.9 F | HEIGHT: 68 IN | HEART RATE: 88 BPM | RESPIRATION RATE: 16 BRPM | BODY MASS INDEX: 27.28 KG/M2 | WEIGHT: 180 LBS
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
## 2023-11-07 PROCEDURE — G0463 HOSPITAL OUTPT CLINIC VISIT: HCPCS

## 2023-11-07 PROCEDURE — 99214 OFFICE O/P EST MOD 30 MIN: CPT

## 2023-11-07 RX ORDER — HYDROCODONE BITARTRATE AND ACETAMINOPHEN 5; 325 MG/1; MG/1
1 TABLET ORAL EVERY 8 HOURS PRN
Qty: 6 TABLET | Refills: 0 | Status: ON HOLD | OUTPATIENT
Start: 2023-11-07 | End: 2023-11-11

## 2023-11-07 NOTE — DISCHARGE INSTRUCTIONS
Please monitor the Bite closely and return for worsening redness, pain, or other signs of infection.  You can use Norco sparingly as needed for severe pain.  Do not drive, drink alcohol, operate heavy machinery, climb ladders while taking this medication.  This medication can cause constipation if used regularly and it may be beneficial to use stool softeners with it.    As discussed, quarantine the cat for 10 days.  If the cat dies or demonstrates any strange behavior, it may need to be tested for rabies, though there is low likelihood of this.

## 2023-11-07 NOTE — ED TRIAGE NOTES
Cat bite to right hand, redness, swelling and pain, states it happened last night at about 10 pm     Triage Assessment (Adult)       Row Name 11/07/23 5289          Triage Assessment    Airway WDL WDL        Respiratory WDL    Respiratory WDL WDL        Peripheral/Neurovascular WDL    Peripheral Neurovascular WDL WDL        Cognitive/Neuro/Behavioral WDL    Cognitive/Neuro/Behavioral WDL WDL

## 2023-11-07 NOTE — ED PROVIDER NOTES
History     Chief Complaint   Patient presents with    Cat Bite     Cat bite to right hand, redness, swelling and pain, states it happened last night at about 10 pm     HPI  Irena Lopez is a 68 year old female who presents to urgent care for concern of cat bite.  Patient states last night she was bitten by a cat.  Cat is not the patient's cat, but she is caring for it in her home.  Patient is not sure if the cat is up-to-date on all its vaccinations.  Cat is a indoor only cat and is otherwise not demonstrated any other abnormal behavior.  Cat bite was sustained to the right hand in several different locations including the thumb and on both the dorsal and volar aspects of the hand.  She reports increased swelling and pain overnight and throughout the day today.  She has tried ibuprofen without any significant relief in her discomfort.  She denies any fevers and has no other new concerns at this time.  Last tetanus in 2016.    Allergies:  Allergies   Allergen Reactions    Ambrosia Artemisiifolia (Ragweed) Skin Test Other (See Comments)     Runny nose, eye irritation    Fluoxetine Other (See Comments)     Symptoms consistent with serotonin syndrome.  Has tolerated other SSRIs without similar reaction.        Problem List:    Patient Active Problem List    Diagnosis Date Noted    Small bowel perforation (H) 10/19/2016     Priority: Medium    Abdominal abscess 10/19/2016     Priority: Medium    Hyperlipidemia 11/05/2013     Priority: Medium    Recurrent major depressive episodes (H24) 04/16/2010     Priority: Medium    Migraines 03/02/2010     Priority: Medium    Allergic state 03/02/2010     Priority: Medium     (Problem list name updated by automated process. Provider to review and confirm.)          Past Medical History:    Past Medical History:   Diagnosis Date    Anxiety     Depressive disorder     Migraine        Past Surgical History:    Past Surgical History:   Procedure Laterality Date    APPENDECTOMY    "      Family History:    Family History   Problem Relation Age of Onset    Arrhythmia Mother     Diabetes Father     Gout Father     Coronary Artery Disease Father        Social History:  Marital Status:  Single [1]  Social History     Tobacco Use    Smoking status: Never   Substance Use Topics    Alcohol use: No     Alcohol/week: 0.0 standard drinks of alcohol     Comment: rarely    Drug use: No        Medications:    amoxicillin-clavulanate (AUGMENTIN) 875-125 MG tablet  HYDROcodone-acetaminophen (NORCO) 5-325 MG tablet  ASTELIN 137 MCG/SPRAY NA SOLN  atorvastatin (LIPITOR) 20 MG tablet  Cholecalciferol (VITAMIN D) 400 UNIT capsule  Coenzyme Q10 (COQ10 PO)  FISH OIL 1000 MG OR CAPS  fluticasone (FLONASE) 50 MCG/ACT nasal spray  Glucosamine-Chondroitin--400-300 MG TABS  MAGNESIUM OXIDE PO  METOCLOPRAMIDE HCL 10 MG OR TABS  MULTIVITAMINS OR TABS  NAPROXEN SODIUM 550 MG OR TABS  NORTRIPTYLINE HCL 50 MG OR CAPS  PATANOL 0.1 % OP SOLN  RELPAX 40 MG OR TABS  sumatriptan (IMITREX) 100 MG tablet  SUPER B COMPLEX/C PO  VERAPAMIL HCL ER PO  ZYRTEC 10 MG OR TABS          Review of Systems   All other systems reviewed and are negative.  See HPI    Physical Exam   BP: (!) 145/85  Pulse: 96  Temp: 97.9  F (36.6  C)  Resp: 18  Height: 174 cm (5' 8.5\")  Weight: 81.6 kg (180 lb)  SpO2: 100 %      Physical Exam  Constitutional:       General: She is not in acute distress.     Appearance: Normal appearance. She is well-developed.   HENT:      Head: Normocephalic and atraumatic.   Eyes:      General: No scleral icterus.     Conjunctiva/sclera: Conjunctivae normal.   Cardiovascular:      Rate and Rhythm: Normal rate.   Pulmonary:      Effort: Pulmonary effort is normal. No respiratory distress.   Abdominal:      General: Abdomen is flat.   Musculoskeletal:      Cervical back: Normal range of motion and neck supple.   Skin:     General: Skin is warm and dry.      Capillary Refill: Capillary refill takes less than 2 seconds.      " Findings: No rash.      Comments: Multiple cat bites and skin punctures noted throughout the right hand.  Is a mild amount of swelling in the surrounding tissues with faint erythema.  No active bleeding.  Patient displays full active and passive range of motion of the affected digits.   Neurological:      Mental Status: She is alert and oriented to person, place, and time.         ED Course                 Procedures                No results found for this or any previous visit (from the past 24 hour(s)).    Medications - No data to display    Assessments & Plan (with Medical Decision Making)   Patient presented to urgent care for concern of cat bite.  She is afebrile on arrival vital signs otherwise reassuring at this time.  Noted to be slightly hypertensive.  Patient is noted to have multiple Bites of the affected right hand.  There is some mild surrounding erythema and swelling noted.  This may be inflammatory versus early onset infectious process.  No evidence of flexor tenosynovitis at this time. Tetanus status is up-to-date.  Will place on Augmentin at this time.  Return precautions for new or worsening symptoms were reviewed.  Norco sparingly for severe pain.  Precautions related to narcotic use were reviewed.  Otherwise Tylenol and ibuprofen as needed.  Patient was discharged in good condition and is agreeable to a plan.  Should follow-up with primary doctor as needed.    I have reviewed the nursing notes.    I have reviewed the findings, diagnosis, plan and need for follow up with the patient.      New Prescriptions    AMOXICILLIN-CLAVULANATE (AUGMENTIN) 875-125 MG TABLET    Take 1 tablet by mouth 2 times daily for 10 days    HYDROCODONE-ACETAMINOPHEN (NORCO) 5-325 MG TABLET    Take 1 tablet by mouth every 8 hours as needed for severe pain       Final diagnoses:   Cat bite, initial encounter       11/7/2023   [facility] EMERGENCY DEPT    Disclaimer:  This note consists of symbols derived  from keyboarding, dictation and/or voice recognition software.  As a result, there may be errors in the script that have gone undetected.  Please consider this when interpreting information found in this chart.         Juan Jose Greenfield APRN CNP  11/07/23 2043

## 2023-11-10 ENCOUNTER — APPOINTMENT (OUTPATIENT)
Dept: GENERAL RADIOLOGY | Facility: CLINIC | Age: 68
End: 2023-11-10
Attending: EMERGENCY MEDICINE
Payer: MEDICARE

## 2023-11-10 ENCOUNTER — HOSPITAL ENCOUNTER (OUTPATIENT)
Facility: CLINIC | Age: 68
Setting detail: OBSERVATION
Discharge: HOME | End: 2023-11-11
Attending: EMERGENCY MEDICINE | Admitting: STUDENT IN AN ORGANIZED HEALTH CARE EDUCATION/TRAINING PROGRAM
Payer: MEDICARE

## 2023-11-10 DIAGNOSIS — L03.113 CELLULITIS OF RIGHT HAND: ICD-10-CM
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
## 2023-11-10 DIAGNOSIS — Z23 NEED FOR PROPHYLACTIC VACCINATION AND INOCULATION AGAINST CHOLERA ALONE: Primary | ICD-10-CM

## 2023-11-10 LAB
ANION GAP SERPL CALCULATED.3IONS-SCNC: 13 MMOL/L (ref 7–15)
BASOPHILS # BLD AUTO: 0 10E3/UL (ref 0–0.2)
BASOPHILS NFR BLD AUTO: 0 %
BUN SERPL-MCNC: 19.8 MG/DL (ref 8–23)
CALCIUM SERPL-MCNC: 9.5 MG/DL (ref 8.8–10.2)
CHLORIDE SERPL-SCNC: 105 MMOL/L (ref 98–107)
CREAT SERPL-MCNC: 0.76 MG/DL (ref 0.51–0.95)
CRP SERPL-MCNC: 42.61 MG/L
DEPRECATED HCO3 PLAS-SCNC: 23 MMOL/L (ref 22–29)
EGFRCR SERPLBLD CKD-EPI 2021: 85 ML/MIN/1.73M2
EOSINOPHIL # BLD AUTO: 0.1 10E3/UL (ref 0–0.7)
EOSINOPHIL NFR BLD AUTO: 2 %
ERYTHROCYTE [DISTWIDTH] IN BLOOD BY AUTOMATED COUNT: 12.3 % (ref 10–15)
GLUCOSE SERPL-MCNC: 106 MG/DL (ref 70–99)
HCT VFR BLD AUTO: 39.9 % (ref 35–47)
HGB BLD-MCNC: 13.4 G/DL (ref 11.7–15.7)
IMM GRANULOCYTES # BLD: 0 10E3/UL
IMM GRANULOCYTES NFR BLD: 0 %
LYMPHOCYTES # BLD AUTO: 1.3 10E3/UL (ref 0.8–5.3)
LYMPHOCYTES NFR BLD AUTO: 19 %
MCH RBC QN AUTO: 30.7 PG (ref 26.5–33)
MCHC RBC AUTO-ENTMCNC: 33.6 G/DL (ref 31.5–36.5)
MCV RBC AUTO: 91 FL (ref 78–100)
MONOCYTES # BLD AUTO: 0.5 10E3/UL (ref 0–1.3)
MONOCYTES NFR BLD AUTO: 7 %
NEUTROPHILS # BLD AUTO: 4.9 10E3/UL (ref 1.6–8.3)
NEUTROPHILS NFR BLD AUTO: 72 %
NRBC # BLD AUTO: 0 10E3/UL
NRBC BLD AUTO-RTO: 0 /100
PLATELET # BLD AUTO: 217 10E3/UL (ref 150–450)
POTASSIUM SERPL-SCNC: 4.1 MMOL/L (ref 3.4–5.3)
RBC # BLD AUTO: 4.37 10E6/UL (ref 3.8–5.2)
SODIUM SERPL-SCNC: 141 MMOL/L (ref 135–145)
WBC # BLD AUTO: 6.8 10E3/UL (ref 4–11)

## 2023-11-10 PROCEDURE — G0378 HOSPITAL OBSERVATION PER HR: HCPCS

## 2023-11-10 PROCEDURE — 86140 C-REACTIVE PROTEIN: CPT | Performed by: EMERGENCY MEDICINE

## 2023-11-10 PROCEDURE — 36415 COLL VENOUS BLD VENIPUNCTURE: CPT | Performed by: EMERGENCY MEDICINE

## 2023-11-10 PROCEDURE — 99285 EMERGENCY DEPT VISIT HI MDM: CPT | Performed by: EMERGENCY MEDICINE

## 2023-11-10 PROCEDURE — 250N000011 HC RX IP 250 OP 636: Mod: JZ | Performed by: EMERGENCY MEDICINE

## 2023-11-10 PROCEDURE — 73130 X-RAY EXAM OF HAND: CPT | Mod: RT

## 2023-11-10 PROCEDURE — 80048 BASIC METABOLIC PNL TOTAL CA: CPT | Performed by: EMERGENCY MEDICINE

## 2023-11-10 PROCEDURE — 90715 TDAP VACCINE 7 YRS/> IM: CPT | Performed by: EMERGENCY MEDICINE

## 2023-11-10 PROCEDURE — 250N000013 HC RX MED GY IP 250 OP 250 PS 637: Performed by: EMERGENCY MEDICINE

## 2023-11-10 PROCEDURE — 90471 IMMUNIZATION ADMIN: CPT | Performed by: EMERGENCY MEDICINE

## 2023-11-10 PROCEDURE — 96365 THER/PROPH/DIAG IV INF INIT: CPT | Performed by: EMERGENCY MEDICINE

## 2023-11-10 PROCEDURE — 99222 1ST HOSP IP/OBS MODERATE 55: CPT | Mod: AI | Performed by: STUDENT IN AN ORGANIZED HEALTH CARE EDUCATION/TRAINING PROGRAM

## 2023-11-10 PROCEDURE — 96366 THER/PROPH/DIAG IV INF ADDON: CPT

## 2023-11-10 PROCEDURE — 99285 EMERGENCY DEPT VISIT HI MDM: CPT | Mod: 25 | Performed by: EMERGENCY MEDICINE

## 2023-11-10 PROCEDURE — 85025 COMPLETE CBC W/AUTO DIFF WBC: CPT | Performed by: EMERGENCY MEDICINE

## 2023-11-10 RX ORDER — AMPICILLIN AND SULBACTAM 2; 1 G/1; G/1
3 INJECTION, POWDER, FOR SOLUTION INTRAMUSCULAR; INTRAVENOUS ONCE
Status: COMPLETED | OUTPATIENT
Start: 2023-11-10 | End: 2023-11-11

## 2023-11-10 RX ORDER — ACETAMINOPHEN 500 MG
1000 TABLET ORAL ONCE
Status: COMPLETED | OUTPATIENT
Start: 2023-11-10 | End: 2023-11-10

## 2023-11-10 RX ORDER — IBUPROFEN 400 MG/1
400 TABLET, FILM COATED ORAL ONCE
Status: COMPLETED | OUTPATIENT
Start: 2023-11-10 | End: 2023-11-10

## 2023-11-10 RX ADMIN — AMPICILLIN SODIUM AND SULBACTAM SODIUM 3 G: 2; 1 INJECTION, POWDER, FOR SOLUTION INTRAMUSCULAR; INTRAVENOUS at 22:21

## 2023-11-10 RX ADMIN — CLOSTRIDIUM TETANI TOXOID ANTIGEN (FORMALDEHYDE INACTIVATED), CORYNEBACTERIUM DIPHTHERIAE TOXOID ANTIGEN (FORMALDEHYDE INACTIVATED), BORDETELLA PERTUSSIS TOXOID ANTIGEN (GLUTARALDEHYDE INACTIVATED), BORDETELLA PERTUSSIS FILAMENTOUS HEMAGGLUTININ ANTIGEN (FORMALDEHYDE INACTIVATED), BORDETELLA PERTUSSIS PERTACTIN ANTIGEN, AND BORDETELLA PERTUSSIS FIMBRIAE 2/3 ANTIGEN 0.5 ML: 5; 2; 2.5; 5; 3; 5 INJECTION, SUSPENSION INTRAMUSCULAR at 22:23

## 2023-11-10 RX ADMIN — ACETAMINOPHEN 1000 MG: 500 TABLET, FILM COATED ORAL at 22:30

## 2023-11-10 RX ADMIN — IBUPROFEN 400 MG: 400 TABLET ORAL at 22:30

## 2023-11-10 ASSESSMENT — ACTIVITIES OF DAILY LIVING (ADL): ADLS_ACUITY_SCORE: 35

## 2023-11-10 NOTE — ED TRIAGE NOTES
Cat bite to right hand on Tuesday. Was seen in [facility] and put on antibiotics. Now swelling is worse.     Triage Assessment (Adult)       Row Name 11/10/23 1700          Triage Assessment    Airway WDL WDL        Respiratory WDL    Respiratory WDL WDL        Skin Circulation/Temperature WDL    Skin Circulation/Temperature WDL WDL        Cardiac WDL    Cardiac WDL WDL        Peripheral/Neurovascular WDL    Peripheral Neurovascular WDL WDL        Cognitive/Neuro/Behavioral WDL    Cognitive/Neuro/Behavioral WDL WDL

## 2023-11-11 VITALS
SYSTOLIC BLOOD PRESSURE: 121 MMHG | BODY MASS INDEX: 27.6 KG/M2 | HEIGHT: 68 IN | DIASTOLIC BLOOD PRESSURE: 64 MMHG | RESPIRATION RATE: 16 BRPM | WEIGHT: 182.1 LBS | OXYGEN SATURATION: 94 % | TEMPERATURE: 97.6 F | HEART RATE: 76 BPM

## 2023-11-11 PROCEDURE — 99239 HOSP IP/OBS DSCHRG MGMT >30: CPT | Performed by: STUDENT IN AN ORGANIZED HEALTH CARE EDUCATION/TRAINING PROGRAM

## 2023-11-11 PROCEDURE — 250N000013 HC RX MED GY IP 250 OP 250 PS 637: Performed by: STUDENT IN AN ORGANIZED HEALTH CARE EDUCATION/TRAINING PROGRAM

## 2023-11-11 PROCEDURE — G0378 HOSPITAL OBSERVATION PER HR: HCPCS

## 2023-11-11 PROCEDURE — 250N000011 HC RX IP 250 OP 636: Mod: JZ | Performed by: STUDENT IN AN ORGANIZED HEALTH CARE EDUCATION/TRAINING PROGRAM

## 2023-11-11 PROCEDURE — 96376 TX/PRO/DX INJ SAME DRUG ADON: CPT

## 2023-11-11 RX ORDER — NALOXONE HYDROCHLORIDE 0.4 MG/ML
0.2 INJECTION, SOLUTION INTRAMUSCULAR; INTRAVENOUS; SUBCUTANEOUS
Status: DISCONTINUED | OUTPATIENT
Start: 2023-11-11 | End: 2023-11-11 | Stop reason: HOSPADM

## 2023-11-11 RX ORDER — NALOXONE HYDROCHLORIDE 0.4 MG/ML
0.4 INJECTION, SOLUTION INTRAMUSCULAR; INTRAVENOUS; SUBCUTANEOUS
Status: DISCONTINUED | OUTPATIENT
Start: 2023-11-11 | End: 2023-11-11 | Stop reason: HOSPADM

## 2023-11-11 RX ORDER — AMPICILLIN AND SULBACTAM 2; 1 G/1; G/1
3 INJECTION, POWDER, FOR SOLUTION INTRAMUSCULAR; INTRAVENOUS EVERY 6 HOURS
Status: DISCONTINUED | OUTPATIENT
Start: 2023-11-11 | End: 2023-11-11 | Stop reason: HOSPADM

## 2023-11-11 RX ORDER — ACETAMINOPHEN 325 MG/1
650 TABLET ORAL EVERY 4 HOURS PRN
Status: DISCONTINUED | OUTPATIENT
Start: 2023-11-11 | End: 2023-11-11 | Stop reason: HOSPADM

## 2023-11-11 RX ORDER — ONDANSETRON 2 MG/ML
4 INJECTION INTRAMUSCULAR; INTRAVENOUS EVERY 6 HOURS PRN
Status: DISCONTINUED | OUTPATIENT
Start: 2023-11-11 | End: 2023-11-11 | Stop reason: HOSPADM

## 2023-11-11 RX ORDER — IBUPROFEN 400 MG/1
800 TABLET, FILM COATED ORAL 3 TIMES DAILY
Status: DISCONTINUED | OUTPATIENT
Start: 2023-11-11 | End: 2023-11-11 | Stop reason: HOSPADM

## 2023-11-11 RX ORDER — ONDANSETRON 4 MG/1
4 TABLET, ORALLY DISINTEGRATING ORAL EVERY 6 HOURS PRN
Status: DISCONTINUED | OUTPATIENT
Start: 2023-11-11 | End: 2023-11-11 | Stop reason: HOSPADM

## 2023-11-11 RX ORDER — OXYCODONE HYDROCHLORIDE 5 MG/1
5-10 TABLET ORAL
Status: DISCONTINUED | OUTPATIENT
Start: 2023-11-11 | End: 2023-11-11 | Stop reason: DRUGHIGH

## 2023-11-11 RX ORDER — IBUPROFEN 600 MG/1
600 TABLET, FILM COATED ORAL EVERY 6 HOURS PRN
Status: DISCONTINUED | OUTPATIENT
Start: 2023-11-11 | End: 2023-11-11 | Stop reason: HOSPADM

## 2023-11-11 RX ORDER — ATORVASTATIN CALCIUM 20 MG/1
20 TABLET, FILM COATED ORAL EVERY EVENING
Status: DISCONTINUED | OUTPATIENT
Start: 2023-11-11 | End: 2023-11-11 | Stop reason: HOSPADM

## 2023-11-11 RX ADMIN — AMPICILLIN SODIUM AND SULBACTAM SODIUM 3 G: 2; 1 INJECTION, POWDER, FOR SOLUTION INTRAMUSCULAR; INTRAVENOUS at 10:39

## 2023-11-11 RX ADMIN — IBUPROFEN 800 MG: 400 TABLET ORAL at 11:22

## 2023-11-11 RX ADMIN — AMPICILLIN SODIUM AND SULBACTAM SODIUM 3 G: 2; 1 INJECTION, POWDER, FOR SOLUTION INTRAMUSCULAR; INTRAVENOUS at 04:50

## 2023-11-11 RX ADMIN — ATORVASTATIN CALCIUM 20 MG: 20 TABLET, FILM COATED ORAL at 01:25

## 2023-11-11 RX ADMIN — IBUPROFEN 800 MG: 400 TABLET ORAL at 07:37

## 2023-11-11 ASSESSMENT — ACTIVITIES OF DAILY LIVING (ADL)
DIFFICULTY_EATING/SWALLOWING: NO
WALKING_OR_CLIMBING_STAIRS_DIFFICULTY: NO
FALL_HISTORY_WITHIN_LAST_SIX_MONTHS: NO
ADLS_ACUITY_SCORE: 20
VISION_MANAGEMENT: READING GLASSES
CONCENTRATING,_REMEMBERING_OR_MAKING_DECISIONS_DIFFICULTY: NO
ADLS_ACUITY_SCORE: 35
TOILETING_ISSUES: NO
CHANGE_IN_FUNCTIONAL_STATUS_SINCE_ONSET_OF_CURRENT_ILLNESS/INJURY: NO
HEARING_DIFFICULTY_OR_DEAF: NO
ADLS_ACUITY_SCORE: 20
WEAR_GLASSES_OR_BLIND: YES
DOING_ERRANDS_INDEPENDENTLY_DIFFICULTY: NO
ADLS_ACUITY_SCORE: 20
EQUIPMENT_CURRENTLY_USED_AT_HOME: GRAB BAR, TUB/SHOWER
DRESSING/BATHING_DIFFICULTY: NO
ADLS_ACUITY_SCORE: 20
DIFFICULTY_COMMUNICATING: NO
ADLS_ACUITY_SCORE: 20
ADLS_ACUITY_SCORE: 20

## 2023-11-11 NOTE — PROGRESS NOTES
"[facility] ADMISSION NOTE    Patient admitted to room 2309 at approximately 0053 via cart from emergency room. Patient was accompanied by transport tech.     Verbal SBAR report received from NATHALIE Pan prior to patient arrival.     Patient ambulated to bed with stand-by assist. Patient alert and oriented X 4. Pain is controlled with current analgesics.  Medication(s) being used: acetaminophen and ibuprofen (OTC).  . Admission vital signs: Blood pressure (!) 159/80, pulse 61, temperature 98.6  F (37  C), temperature source Oral, resp. rate 16, height 1.727 m (5' 8\"), weight 82.6 kg (182 lb 1.6 oz), SpO2 98%. Patient was oriented to plan of care, call light, bed controls, tv, telephone, bathroom, and visiting hours.     Risk Assessment    The following safety risks were identified during admission: none. Yellow risk band applied: NO.     Skin Initial Assessment    This writer admitted this patient and completed a full skin assessment and Mike score in the Adult PCS flowsheet. Appropriate interventions initiated as needed.     Secondary skin check declined.    Mike Risk Assessment  Sensory Perception: 4-->no impairment  Moisture: 4-->rarely moist  Activity: 4-->walks frequently  Mobility: 4-->no limitation  Nutrition: 3-->adequate  Friction and Shear: 3-->no apparent problem  Mike Score: 22  Mattress: Standard gel/foam mattress (IsoFlex, Atmos Air, etc.)  Bed Frame: Standard width and length    Education    Patient has a Bailey to Observation order: Yes  Observation education completed and documented: Yes      Renetta Corrales RN      "

## 2023-11-11 NOTE — ED NOTES
"[facility]   Admission Handoff    The patient is Irena Lopez, 68 year old who arrived in the ED by CAR from home with a complaint of Cat Bite  . The patient's current symptoms are new and during this time the symptoms have remained the same. In the ED, patient was diagnosed with   Final diagnoses:   Cellulitis of right hand   Cat bite, initial encounter         Needed?: No    Allergies:    Allergies   Allergen Reactions    Ambrosia Artemisiifolia (Ragweed) Skin Test Other (See Comments)     Runny nose, eye irritation    Fluoxetine Other (See Comments)     Symptoms consistent with serotonin syndrome.  Has tolerated other SSRIs without similar reaction.        Past Medical Hx:   Past Medical History:   Diagnosis Date    Anxiety     Depressive disorder     Migraine        Initial vitals were: BP: (!) 146/81  Pulse: 77  Temp: 98  F (36.7  C)  Resp: 16  Height: 172.7 cm (5' 8\")  Weight: 82.6 kg (182 lb)  SpO2: 98 %   Recent vital Signs: BP (!) 146/81   Pulse 77   Temp 98  F (36.7  C) (Oral)   Resp 16   Ht 1.727 m (5' 8\")   Wt 82.6 kg (182 lb)   SpO2 98%   BMI 27.67 kg/m      Elimination Status: Continent: Yes     Activity Level: SBA    Fall Status: Reason for falls risk:  Mobility  nonskid shoes/slippers when out of bed, arm band in place, and patient and family education    Baseline Mental status: WDL  Current Mental Status changes: at basesline    Infection present or suspected this encounter: yes skin/wound/contact  Sepsis suspected: No    Isolation type: none    Bariatric equipment needed?: No    In the ED these meds were given:   Medications   ampicillin-sulbactam (UNASYN) 3 g vial to attach to  mL bag (3 g Intravenous $New Bag 11/10/23 2221)   Tdap (tetanus-diphtheria-acell pertussis) (ADACEL) injection 0.5 mL (0.5 mLs Intramuscular $Given 11/10/23 2223)   acetaminophen (TYLENOL) tablet 1,000 mg (1,000 mg Oral $Given 11/10/23 2230)   ibuprofen (ADVIL/MOTRIN) " tablet 400 mg (400 mg Oral $Given 11/10/23 5125)       Drips running?  Yes    Home pump  No    Current LDAs: Peripheral IV: Site L forearm; Gauge 20  normal saline     Results:   Labs/Imaging  Ordered and Resulted from Time of ED Arrival Up to the Time of Departure from the ED  Results for orders placed or performed during the hospital encounter of 11/10/23 (from the past 24 hour(s))   CBC with platelets differential    Narrative    The following orders were created for panel order CBC with platelets differential.  Procedure                               Abnormality         Status                     ---------                               -----------         ------                     CBC with platelets and d...[[id number]]                      Final result                 Please view results for these tests on the individual orders.   Basic metabolic panel   Result Value Ref Range    Sodium 141 135 - 145 mmol/L    Potassium 4.1 3.4 - 5.3 mmol/L    Chloride 105 98 - 107 mmol/L    Carbon Dioxide (CO2) 23 22 - 29 mmol/L    Anion Gap 13 7 - 15 mmol/L    Urea Nitrogen 19.8 8.0 - 23.0 mg/dL    Creatinine 0.76 0.51 - 0.95 mg/dL    GFR Estimate 85 >60 mL/min/1.73m2    Calcium 9.5 8.8 - 10.2 mg/dL    Glucose 106 (H) 70 - 99 mg/dL   CRP inflammation   Result Value Ref Range    CRP Inflammation 42.61 (H) <5.00 mg/L   CBC with platelets and differential   Result Value Ref Range    WBC Count 6.8 4.0 - 11.0 10e3/uL    RBC Count 4.37 3.80 - 5.20 10e6/uL    Hemoglobin 13.4 11.7 - 15.7 g/dL    Hematocrit 39.9 35.0 - 47.0 %    MCV 91 78 - 100 fL    MCH 30.7 26.5 - 33.0 pg    MCHC 33.6 31.5 - 36.5 g/dL    RDW 12.3 10.0 - 15.0 %    Platelet Count 217 150 - 450 10e3/uL    % Neutrophils 72 %    % Lymphocytes 19 %    % Monocytes 7 %    % Eosinophils 2 %    % Basophils 0 %    % Immature Granulocytes 0 %    NRBCs per 100 WBC 0 <1 /100    Absolute Neutrophils 4.9 1.6 - 8.3 10e3/uL    Absolute Lymphocytes 1.3 0.8 - 5.3 10e3/uL    Absolute  Monocytes 0.5 0.0 - 1.3 10e3/uL    Absolute Eosinophils 0.1 0.0 - 0.7 10e3/uL    Absolute Basophils 0.0 0.0 - 0.2 10e3/uL    Absolute Immature Granulocytes 0.0 <=0.4 10e3/uL    Absolute NRBCs 0.0 10e3/uL       For the majority of the shift this patient's behavior was Green     Cardiac Rhythm: N/A  Pt needs tele? No  Skin/wound Issues:  Cat    Code Status: Full Code    Pain control: good    Nausea control: pt had none    Abnormal labs/tests/findings requiring intervention: See above    Patient tested for COVID 19 prior to admission: NO     OBS brochure/video discussed/provided to patient/family: Yes     Family present during ED course? No     Family Comments/Social Situation comments: Lives at home    Tasks needing completion: None    Viviane Pinedo RN

## 2023-11-11 NOTE — DISCHARGE SUMMARY
"[facility]  Hospitalist Discharge Summary      Date of Admission:  11/10/2023  Date of Discharge:  11/11/2023  Discharging Provider: Koko Schultz MD  Discharge Service: Hospitalist Service    Discharge Diagnoses   Integrated into hospital course below    Clinically Significant Risk Factors     # Overweight: Estimated body mass index is 27.69 kg/m  as calculated from the following:    Height as of this encounter: 1.727 m (5' 8\").    Weight as of this encounter: 82.6 kg (182 lb 1.6 oz).       Follow-ups Needed After Discharge   Follow-up Appointments     Follow-up and recommended labs and tests       Follow up with primary care provider, Physician No Ref-Primary, within 7   days for hospital follow- up.        Ensure that cellulitis and hand has improved    Unresulted Labs Ordered in the Past 30 Days of this Admission       No orders found for last 31 day(s).        These results will be followed up by Koko Schultz MD      Discharge Disposition   Discharged to home  Condition at discharge: Stable    Hospital Course   Irena Lopez is a 68 year old female admitted on 11/10/2023. She is representing after being treat for an animal bite 11/7.  Patient went home and was taking appropriate antibiotics however swelling worsened and so patient returned to ER.  Patient admitted for observation overnight due to failure of oral antibiotic therapy for right hand cellulitis secondary to cat bite.  Subsequently improved with IV Unasyn.  Unclear why patient potentially worsened on Augmentin and now improved with Unasyn.  Discussed with infectious disease and given patient's improvement and lack of profound erythema or other concerning findings such as abscess or systemic signs of infection we will plan to resume Augmentin for completion of 14-day antibiotic course.  Patient's case had been discussed orthopedics by admitting provider and reportedly no concern for tendon involvement.  X-ray " without foreign body.  -Completing 14 days of antibiotics  -Follow-up with PCP in 7 days  -Discussed things for patient to watch for that should prompt her to present back to the hospital    Consultations This Hospital Stay   ORTHOPEDIC SURGERY IP CONSULT  WOUND OSTOMY CONTINENCE NURSE  IP CONSULT    Code Status   Full Code    Time Spent on this Encounter   I, Koko Schultz MD, personally saw the patient today and spent greater than 30 minutes discharging this patient.       Koko Schultz MD  [facility]  [address] [address]  Phone: [phone]  Fax: [phone]  ______________________________________________________________________    Physical Exam   Vital Signs: Temp: 97.6  F (36.4  C) Temp src: Oral BP: 121/64 Pulse: 76   Resp: 16 SpO2: 94 % O2 Device: None (Room air)    Weight: 182 lbs 1.6 oz  General Appearance: Awake and alert, sitting up in bed, in no acute distress  Respiratory: Breathing easily on room air  Cardiovascular: Regular rate and rhythm  GI: Nondistended  Skin: Puncture wounds with scabs on right hand with swelling of the right thumb without induration or drainage.  Limited swelling of hand without appreciable swelling and digits besides thumb.  No noted erythema of hand or wrist besides what is directly adjacent to small scabs around punctures, able to bend thumb and make fist  Other: [place] mood and affect       Primary Care Physician   Physician No Ref-Primary    Discharge Orders      Reason for your hospital stay    You were hospitalized with worsening Bite.  Cat bite subsequently improved and you are being discharged home to complete a course of antibiotics by mouth.     Follow-up and recommended labs and tests     Follow up with primary care provider, Physician No Ref-Primary, within 7 days for hospital follow- up.     Activity    Your activity upon discharge: activity as tolerated     Diet    Follow this diet upon  discharge: Orders Placed This Encounter      Regular Diet Adult       Significant Results and Procedures   Results for orders placed or performed during the hospital encounter of 11/10/23   XR Hand Right G/E 3 Views    Narrative    EXAM: XR HAND RIGHT G/E 3 VIEWS  LOCATION: [facility]  DATE: 11/10/2023    INDICATION: multiple cat bites, please eval for retained FB  COMPARISON: None.      Impression    IMPRESSION: Normal joint spaces and alignment. No fracture. No suspicious foreign body evident.       Discharge Medications   Current Discharge Medication List        CONTINUE these medications which have CHANGED    Details   amoxicillin-clavulanate (AUGMENTIN) 875-125 MG tablet Take 1 tablet by mouth 2 times daily for 9 days  Qty: 18 tablet, Refills: 0    Associated Diagnoses: Cellulitis of right hand           CONTINUE these medications which have NOT CHANGED    Details   ASTELIN 137 MCG/SPRAY NA SOLN USE 1-2 SPRAYS IN EACH NOSTRIL TWICE DAILY as needed for severe allergy symptoms  Qty: 1 Bottle, Refills: 0      fluticasone (FLONASE) 50 MCG/ACT nasal spray Spray 1 spray into both nostrils 2 times daily      METOCLOPRAMIDE HCL 10 MG OR TABS 1 TABLET by mouth daily AS NEEDED  Qty: 30 Tab      MULTIVITAMINS OR TABS ONE DAILY  Qty: 100 Tab      NAPROXEN SODIUM 550 MG OR TABS 1 TABLET TWICE DAILY PRN  Qty: 60 Tab      NORTRIPTYLINE HCL 50 MG OR CAPS Take  mg by mouth At Bedtime   Qty: 90 Cap      PATANOL 0.1 % OP SOLN 1 gtt each eye BID prn  Qty: 1 Bottle      RELPAX 40 MG OR TABS ONE TABLET WITH ONSET OF MIGRAINE, MAY REPEAT ONCE AFTER 2 HOURS. DO NOT EXCEED 2 TABLETS IN 24 HOURS.  Qty: 6 Tab      sumatriptan (IMITREX) 100 MG tablet Take 100 mg by mouth at onset of headache. May repeat in 2 hours if needed: max 2/day; average number of headaches monthly: 4      atorvastatin (LIPITOR) 20 MG tablet Take 20 mg by mouth every evening       Cholecalciferol (VITAMIN D) 400 UNIT capsule Take 1  capsule by mouth daily.      Coenzyme Q10 (COQ10 PO) Take 200 mg by mouth every evening      FISH OIL 1000 MG OR CAPS ONE DAILY  Qty: 30 Cap, Refills: 0      Glucosamine-Chondroitin--400-300 MG TABS Take 1 tablet by mouth daily.      MAGNESIUM OXIDE PO Take 400 mg by mouth every evening      SUPER B COMPLEX/C PO Take 1 tablet by mouth every evening      VERAPAMIL HCL ER PO Take 120 mg by mouth daily      ZYRTEC 10 MG OR TABS ONE TABLET DAILY in pm  Qty: 30 Tab, Refills: 0           STOP taking these medications       HYDROcodone-acetaminophen (NORCO) 5-325 MG tablet Comments:   Reason for Stopping:             Allergies   Allergies   Allergen Reactions    Ambrosia Artemisiifolia (Ragweed) Skin Test Other (See Comments)     Runny nose, eye irritation    Fluoxetine Other (See Comments)     Symptoms consistent with serotonin syndrome.  Has tolerated other SSRIs without similar reaction.

## 2023-11-11 NOTE — PLAN OF CARE
[facility] DISCHARGE NOTE    Patient discharged to home at 1:50 PM via ambulation. Accompanied by staff. Discharge instructions reviewed with patient, opportunity offered to ask questions. Prescriptions sent with patient to fill . All belongings sent with patient.    Dmitry Perry RN

## 2023-11-11 NOTE — H&P
"[facility]    History and Physical - Hospitalist Service       Date of Admission:  11/10/2023    Assessment & Plan      Irena Lopez is a 68 year old female admitted on 11/10/2023. She is representing after being treat for an animal bite 11/7.  Patient went home and was taking appropriate antibiotics however swelling worsened and so patient returned to ER.  Patient admitted for observation overnight due to \"failure\" of oral antibiotic therapy for right hand cellulitis secondary to cat bite.      Cellulitis Right hand  Animal Bite  Seen 11/07 for cat bite to hand and discharged with appropriate abx (augmentin)  Re-presented 11/10 due to worsening pain and swelling  Changed to IV abx unasyn  Exam: able to make fist but not necessarily fully with thumb. Pain with passive extension of thumb and 5th digit.     Continue IV unasyn   Spoke to on call ortho: low concern for flexor tenosynovitis at this time given patient's ability to nearly make a fist however if patient does not worsen can recontact and consult orthopedics.  Could consider maceration dressing to help improve cellulitis more rapidly  Wound care consulted  Pain management  Received tetanus in ER 11/10  Plain films pending          Observation Goals: -diagnostic tests and consults completed and resulted, -vital signs normal or at patient baseline, Nurse to notify provider when observation goals have been met and patient is ready for discharge.  List all goals to be met before discharge home  ,  ~Diagnostic tests and consults completed and ,     resulted., ~Vital signs at baseline., ~Tolerating oral intake., ~Safe discharge environment has been,    identified by care management., ~Adequate pain control on oral analgesia., ~Ability to ambulate is at baseline., ~Tolerating oral antibiotics without worsening,    of infection., ~O2 sats at baseline or greater than 93% on ,         room air., Nurse to notify Provider when observation " "goals have been met and patient is ready for discharge.  Diet: Regular Diet Adultregular  DVT Prophylaxis: Low Risk/Ambulatory with no VTE prophylaxis indicated  [place] Catheter: Not present  Lines: None     Cardiac Monitoring: None  Code Status: Full [place]    Clinically Significant Risk Factors Present on Admission                       # Overweight: Estimated body mass index is 27.69 kg/m  as calculated from the following:    Height as of this encounter: 1.727 m (5' 8\").    Weight as of this encounter: 82.6 kg (182 lb 1.6 oz).              Disposition Plan      Expected Discharge Date: 11/12/2023                  Bashir Bello DO  Hospitalist Service  [facility]  Securely message with [employer] (more info)  Text page via [facility] Paging/Directory     ______________________________________________________________________    Chief Complaint   Hand wound    History is obtained from the patient    History of Present Illness   Irena Lopez is a 68 year old female who presents to the ER for hand wound.  Patient was seen 11/7 after being bitten by an unknown cat.  Patient was discharged home with Augmentin which patient states she was taking.  Despite appropriate antibiotic therapy patient states the swelling in her hand has gotten worse and the pain has to although not necessarily the wound/redness.  Denies any signs of systemic illness/infection but is wanting relief of pain in her dominant hand.    ER course: Patient examined in ER without signs or concern for flexor tenosynovitis.  ER provider change patient to IV antibiotics, provided tetanus, and is obtaining plain films of hand prior to patient arriving upstairs.    Patient admitted to observation for pain management and IV antibiotic therapy for cellulitis secondary to cat bite.      Past Medical History    Past Medical History:   Diagnosis Date    Anxiety     Depressive disorder     Migraine        Past Surgical History   Past Surgical " History:   Procedure Laterality Date    APPENDECTOMY         Prior to Admission Medications   Prior to Admission Medications   Prescriptions Last Dose Informant Patient Reported? Taking?   ASTELIN 137 MCG/SPRAY NA SOLN  Self Yes No   Sig: USE 1-2 SPRAYS IN EACH NOSTRIL TWICE DAILY as needed for severe allergy symptoms   Cholecalciferol (VITAMIN D) 400 UNIT capsule  Self Yes No   Sig: Take 1 capsule by mouth daily.   Coenzyme Q10 (COQ10 PO)  Self Yes No   Sig: Take 200 mg by mouth every evening   FISH OIL 1000 MG OR CAPS  Self Yes No   Sig: ONE DAILY   Glucosamine-Chondroitin--400-300 MG TABS  Self Yes No   Sig: Take 1 tablet by mouth daily.   HYDROcodone-acetaminophen (NORCO) 5-325 MG tablet   No No   Sig: Take 1 tablet by mouth every 8 hours as needed for severe pain   MAGNESIUM OXIDE PO  Self Yes No   Sig: Take 400 mg by mouth every evening   METOCLOPRAMIDE HCL 10 MG OR TABS  Self Yes No   Si TABLET by mouth daily AS NEEDED   MULTIVITAMINS OR TABS  Self Yes No   Sig: ONE DAILY   NAPROXEN SODIUM 550 MG OR TABS  Self Yes No   Si TABLET TWICE DAILY PRN   NORTRIPTYLINE HCL 50 MG OR CAPS  Self Yes No   Sig: Take  mg by mouth At Bedtime    PATANOL 0.1 % OP SOLN  Self Yes No   Si gtt each eye BID prn   RELPAX 40 MG OR TABS  Self Yes No   Sig: ONE TABLET WITH ONSET OF MIGRAINE, MAY REPEAT ONCE AFTER 2 HOURS. DO NOT EXCEED 2 TABLETS IN 24 HOURS.   SUPER B COMPLEX/C PO  Self Yes No   Sig: Take 1 tablet by mouth every evening   VERAPAMIL HCL ER PO  Self Yes No   Sig: Take 120 mg by mouth daily   ZYRTEC 10 MG OR TABS  Self Yes No   Sig: ONE TABLET DAILY in pm   amoxicillin-clavulanate (AUGMENTIN) 875-125 MG tablet   No No   Sig: Take 1 tablet by mouth 2 times daily for 10 days   atorvastatin (LIPITOR) 20 MG tablet  Self Yes No   Sig: Take 20 mg by mouth every evening    fluticasone (FLONASE) 50 MCG/ACT nasal spray  Self Yes No   Sig: Spray 1 spray into both nostrils 2 times daily   sumatriptan  (IMITREX) 100 MG tablet  Self Yes No   Sig: Take 100 mg by mouth at onset of headache. May repeat in 2 hours if needed: max 2/day; average number of headaches monthly: 4      Facility-Administered Medications: None        Review of Systems    The 10 point Review of Systems is negative other than noted in the HPI or here.     Social History   I have reviewed this patient's social history and updated it with pertinent information if needed.  Social History     Tobacco Use    Smoking status: Never   Substance Use Topics    Alcohol use: No     Alcohol/week: 0.0 standard drinks of alcohol     Comment: rarely    Drug use: No        Physical Exam   Vital Signs: Temp: 98  F (36.7  C) Temp src: Oral BP: (!) 146/81 Pulse: 77   Resp: 16 SpO2: 98 % O2 Device: None (Room air)    Weight: 182 lbs 1.6 oz    Physical Exam  Constitutional:       Appearance: She is not toxic-appearing.   HENT:      Head: Normocephalic.      Mouth/Throat:      Mouth: Mucous membranes are moist.   Cardiovascular:      Rate and Rhythm: Normal rate.      Pulses: Normal pulses.   Pulmonary:      Effort: Pulmonary effort is normal.   Abdominal:      General: Abdomen is flat.   Musculoskeletal:      Right hand: Swelling (1st and 5th digit) and tenderness (to palpation volar surface below 1st/5th digit. pain with passive extension of 1st/5th digit with radiation down volar aspect to wrist.) present. Decreased range of motion (1st and 5th . able to nearly make a fist. thumb most affected). Normal sensation. Normal capillary refill.   Neurological:      Mental Status: She is alert.           Medical Decision Making       50 MINUTES SPENT BY ME on the date of service doing chart review, history, exam, documentation & further activities per the note.      Data     I have personally reviewed the following data over the past 24 hrs:    6.8  \   13.4   / 217     141 105 19.8 /  106 (H)   4.1 23 0.76 \     Procal: N/A CRP: 42.61 (H) Lactic Acid: N/A         Imaging  results reviewed over the past 24 hrs:   Recent Results (from the past 24 hour(s))   XR Hand Right G/E 3 Views    Narrative    EXAM: XR HAND RIGHT G/E 3 VIEWS  LOCATION: [facility]  DATE: 11/10/2023    INDICATION: multiple cat bites, please eval for retained FB  COMPARISON: None.      Impression    IMPRESSION: Normal joint spaces and alignment. No fracture. No suspicious foreign body evident.

## 2023-11-11 NOTE — ED PROVIDER NOTES
History     Chief Complaint   Patient presents with    Cat Bite     HPI  Irena Lopez is a 68 year old right hand dominant female who was bitten multiple times on her right hand 3 days ago, seen here in UC and discharged w/ Augmentin which she has taken 6 doses. She had been taking naproxen and hydrocodone for pain control.  Today she says the swelling has increased substantially from before.  Erythema is about the same.  Pain is also worsening.  Swelling progressing into her wrist  Whole hand feels stiff.  No fevers.    The patient's PMHx, Surgical Hx, Allergies, and Medications were all reviewed with the patient.    Allergies:  Allergies   Allergen Reactions    Ambrosia Artemisiifolia (Ragweed) Skin Test Other (See Comments)     Runny nose, eye irritation    Fluoxetine Other (See Comments)     Symptoms consistent with serotonin syndrome.  Has tolerated other SSRIs without similar reaction.        Problem List:    Patient Active Problem List    Diagnosis Date Noted    Small bowel perforation (H) 10/19/2016     Priority: Medium    Abdominal abscess 10/19/2016     Priority: Medium    Hyperlipidemia 11/05/2013     Priority: Medium    Recurrent major depressive episodes (H24) 04/16/2010     Priority: Medium    Migraines 03/02/2010     Priority: Medium    Allergic state 03/02/2010     Priority: Medium     (Problem list name updated by automated process. Provider to review and confirm.)          Past Medical History:    Past Medical History:   Diagnosis Date    Anxiety     Depressive disorder     Migraine        Past Surgical History:    Past Surgical History:   Procedure Laterality Date    APPENDECTOMY         Family History:    Family History   Problem Relation Age of Onset    Arrhythmia Mother     Diabetes Father     Gout Father     Coronary Artery Disease Father        Social History:  Marital Status:  Single [1]  Social History     Tobacco Use    Smoking status: Never   Substance Use Topics    Alcohol use: No      "Alcohol/week: 0.0 standard drinks of alcohol     Comment: rarely    Drug use: No        Medications:    amoxicillin-clavulanate (AUGMENTIN) 875-125 MG tablet  ASTELIN 137 MCG/SPRAY NA SOLN  atorvastatin (LIPITOR) 20 MG tablet  Cholecalciferol (VITAMIN D) 400 UNIT capsule  Coenzyme Q10 (COQ10 PO)  FISH OIL 1000 MG OR CAPS  fluticasone (FLONASE) 50 MCG/ACT nasal spray  Glucosamine-Chondroitin--400-300 MG TABS  HYDROcodone-acetaminophen (NORCO) 5-325 MG tablet  MAGNESIUM OXIDE PO  METOCLOPRAMIDE HCL 10 MG OR TABS  MULTIVITAMINS OR TABS  NAPROXEN SODIUM 550 MG OR TABS  NORTRIPTYLINE HCL 50 MG OR CAPS  PATANOL 0.1 % OP SOLN  RELPAX 40 MG OR TABS  sumatriptan (IMITREX) 100 MG tablet  SUPER B COMPLEX/C PO  VERAPAMIL HCL ER PO  ZYRTEC 10 MG OR TABS          Review of Systems  Pertinent positives and negatives mentioned in HPI    Physical Exam   BP: (!) 146/81  Pulse: 77  Temp: 98  F (36.7  C)  Resp: 16  Height: 172.7 cm (5' 8\")  Weight: 82.6 kg (182 lb)  SpO2: 98 %    Physical Exam  GEN: Awake, alert, and cooperative.   HENT: MMM. External ears and nose normal bilaterally.  EYES: EOM intact. Conjunctiva clear. No discharge.   NECK: Symmetric, freely mobile.   CV : Extremities warm and well perfused.  PULM: Normal effort. Speaking in full sentences.  NEURO: Normal speech. Following commands.  Answering questions and interacting appropriately.   EXT: No gross deformity. Multiple wounds to right hand and right thumb consistent with reported Cat bite. No fusiform edema of digits. Pain and swelling worst at thumb. See photos below  INT: Warm. No diaphoresis.                     ED Course        Procedures                 Critical Care time:  none               No results found for this or any previous visit (from the past 24 hour(s)).    Medications   ampicillin-sulbactam (UNASYN) 3 g vial to attach to  mL bag (has no administration in time range)       Assessments & Plan (with Medical Decision Making)   68 year old " f right-hand-dominant female bitten by friend's cat which is reportedly up-to-date on immunizations 3 days ago and right hand multiple times.  Started on Augmentin and has taken 6 doses.  Today pain and swelling have worsened and is worried infection is progressing.  She reports substantial change since this morning.  No fevers.  Chart review shows that she was in contact with primary office regarding having her tetanus updated.  Chart review shows that it was last given in 2016.  They said that there could be benefit and plan for office visit for such.  Given that she is here we will update this today.  Given that she is not improving after 3 days of oral antimicrobial therapy will give IV Unasyn.  No blood cultures and she has been on antibiotic treatment.  Will obtain plain films of hand to evaluate for retained foreign body, this images are pending at time of disposition.    Case discussed with Eugenia with the hospitalist service who accepted admission.     I have reviewed the nursing notes.         New Prescriptions    No medications on file       Final diagnoses:   Cellulitis of right hand   Cat bite, initial encounter     Franklin Pena MD        11/10/2023   [facility] EMERGENCY DEPT    Disclaimer: This note consists of words and symbols derived from keyboarding and dictation using voice recognition software.  As a result, there may be errors that have gone undetected.  Please consider this when interpreting information found in this note.               Franklin Pena MD  11/10/23 [id number]